# Patient Record
Sex: FEMALE | Race: WHITE | URBAN - METROPOLITAN AREA
[De-identification: names, ages, dates, MRNs, and addresses within clinical notes are randomized per-mention and may not be internally consistent; named-entity substitution may affect disease eponyms.]

---

## 2017-08-09 ENCOUNTER — HOSPITAL ENCOUNTER (OUTPATIENT)
Dept: CT IMAGING | Age: 42
Discharge: HOME | End: 2017-08-09
Payer: COMMERCIAL

## 2017-08-09 VITALS — BODY MASS INDEX: 31.65 KG/M2 | WEIGHT: 190 LBS | HEIGHT: 65 IN

## 2017-08-09 DIAGNOSIS — R93.0 MAXILLARY OPACIFICATION: ICD-10-CM

## 2017-08-09 DIAGNOSIS — J32.0 CHRONIC MAXILLARY SINUSITIS: ICD-10-CM

## 2017-08-09 PROCEDURE — 70486 CT MAXILLOFACIAL W/O DYE: CPT

## 2020-12-20 ENCOUNTER — HOSPITAL ENCOUNTER (EMERGENCY)
Age: 45
Discharge: HOME | End: 2020-12-21
Attending: EMERGENCY MEDICINE
Payer: COMMERCIAL

## 2020-12-20 PROCEDURE — 99285 EMERGENCY DEPT VISIT HI MDM: CPT

## 2020-12-20 RX ORDER — 0.9 % SODIUM CHLORIDE 0.9 %
500 INTRAVENOUS SOLUTION INTRAVENOUS ONCE
Status: COMPLETED | OUTPATIENT
Start: 2020-12-20 | End: 2020-12-21

## 2020-12-21 ENCOUNTER — APPOINTMENT (OUTPATIENT)
Dept: GENERAL RADIOLOGY | Age: 45
End: 2020-12-21
Payer: COMMERCIAL

## 2020-12-21 ENCOUNTER — APPOINTMENT (OUTPATIENT)
Dept: CT IMAGING | Age: 45
End: 2020-12-21
Payer: COMMERCIAL

## 2020-12-21 VITALS
SYSTOLIC BLOOD PRESSURE: 147 MMHG | RESPIRATION RATE: 21 BRPM | BODY MASS INDEX: 27.49 KG/M2 | TEMPERATURE: 98.7 F | HEIGHT: 65 IN | HEART RATE: 91 BPM | DIASTOLIC BLOOD PRESSURE: 94 MMHG | OXYGEN SATURATION: 97 % | WEIGHT: 165 LBS

## 2020-12-21 LAB
ANION GAP SERPL CALCULATED.3IONS-SCNC: 15 MEQ/L (ref 9–15)
BASOPHILS ABSOLUTE: 0.1 K/UL (ref 0–0.2)
BASOPHILS RELATIVE PERCENT: 0.9 %
BUN BLDV-MCNC: 10 MG/DL (ref 6–20)
CALCIUM SERPL-MCNC: 9.1 MG/DL (ref 8.5–9.9)
CHLORIDE BLD-SCNC: 95 MEQ/L (ref 95–107)
CHP ED QC CHECK: NORMAL
CO2: 27 MEQ/L (ref 20–31)
CREAT SERPL-MCNC: 0.77 MG/DL (ref 0.5–0.9)
EKG ATRIAL RATE: 90 BPM
EKG P AXIS: 54 DEGREES
EKG P-R INTERVAL: 192 MS
EKG Q-T INTERVAL: 386 MS
EKG QRS DURATION: 90 MS
EKG QTC CALCULATION (BAZETT): 472 MS
EKG R AXIS: 18 DEGREES
EKG T AXIS: 67 DEGREES
EKG VENTRICULAR RATE: 90 BPM
EOSINOPHILS ABSOLUTE: 0.1 K/UL (ref 0–0.7)
EOSINOPHILS RELATIVE PERCENT: 1.3 %
GFR AFRICAN AMERICAN: >60
GFR NON-AFRICAN AMERICAN: >60
GLUCOSE BLD-MCNC: 92 MG/DL (ref 70–99)
HCT VFR BLD CALC: 37.4 % (ref 37–47)
HEMOGLOBIN: 12.3 G/DL (ref 12–16)
LYMPHOCYTES ABSOLUTE: 2.4 K/UL (ref 1–4.8)
LYMPHOCYTES RELATIVE PERCENT: 23.5 %
MAGNESIUM: 2.1 MG/DL (ref 1.7–2.4)
MCH RBC QN AUTO: 27.3 PG (ref 27–31.3)
MCHC RBC AUTO-ENTMCNC: 32.9 % (ref 33–37)
MCV RBC AUTO: 83.1 FL (ref 82–100)
MONOCYTES ABSOLUTE: 0.6 K/UL (ref 0.2–0.8)
MONOCYTES RELATIVE PERCENT: 5.4 %
NEUTROPHILS ABSOLUTE: 7.1 K/UL (ref 1.4–6.5)
NEUTROPHILS RELATIVE PERCENT: 68.9 %
PDW BLD-RTO: 17.7 % (ref 11.5–14.5)
PLATELET # BLD: 280 K/UL (ref 130–400)
POTASSIUM SERPL-SCNC: 2.7 MEQ/L (ref 3.4–4.9)
PREGNANCY TEST URINE, POC: NEGATIVE
RBC # BLD: 4.5 M/UL (ref 4.2–5.4)
SODIUM BLD-SCNC: 137 MEQ/L (ref 135–144)
TROPONIN: <0.01 NG/ML (ref 0–0.01)
WBC # BLD: 10.3 K/UL (ref 4.8–10.8)

## 2020-12-21 PROCEDURE — 84484 ASSAY OF TROPONIN QUANT: CPT

## 2020-12-21 PROCEDURE — 71045 X-RAY EXAM CHEST 1 VIEW: CPT

## 2020-12-21 PROCEDURE — 70450 CT HEAD/BRAIN W/O DYE: CPT

## 2020-12-21 PROCEDURE — 2580000003 HC RX 258: Performed by: EMERGENCY MEDICINE

## 2020-12-21 PROCEDURE — 83735 ASSAY OF MAGNESIUM: CPT

## 2020-12-21 PROCEDURE — 80048 BASIC METABOLIC PNL TOTAL CA: CPT

## 2020-12-21 PROCEDURE — 36415 COLL VENOUS BLD VENIPUNCTURE: CPT

## 2020-12-21 PROCEDURE — 85025 COMPLETE CBC W/AUTO DIFF WBC: CPT

## 2020-12-21 PROCEDURE — 93005 ELECTROCARDIOGRAM TRACING: CPT | Performed by: EMERGENCY MEDICINE

## 2020-12-21 PROCEDURE — 6370000000 HC RX 637 (ALT 250 FOR IP): Performed by: EMERGENCY MEDICINE

## 2020-12-21 PROCEDURE — 93010 ELECTROCARDIOGRAM REPORT: CPT | Performed by: INTERNAL MEDICINE

## 2020-12-21 RX ORDER — POTASSIUM CHLORIDE 20 MEQ/1
40 TABLET, EXTENDED RELEASE ORAL ONCE
Status: COMPLETED | OUTPATIENT
Start: 2020-12-21 | End: 2020-12-21

## 2020-12-21 RX ADMIN — SODIUM CHLORIDE 500 ML: 9 INJECTION, SOLUTION INTRAVENOUS at 00:00

## 2020-12-21 RX ADMIN — POTASSIUM CHLORIDE 40 MEQ: 20 TABLET, EXTENDED RELEASE ORAL at 02:16

## 2020-12-21 NOTE — ED PROVIDER NOTES
[address] [facility] ED  EMERGENCY DEPARTMENT ENCOUNTER      Pt Name: Cherry Saunders  MRN: [id number]  [place] 1975  Date of evaluation: 12/20/2020  Provider: Elian Howard MD    CHIEF COMPLAINT       Chief Complaint   Patient presents with    Fall         HISTORY OF PRESENT ILLNESS   (Location/Symptom, Timing/Onset, Context/Setting, Quality, Duration, Modifying Factors, Severity)  Note limiting factors. 79-year-old female presenting after a syncopal event about 1-2 hours prior to arrival.  Patient states she walked out of her shower and passed out. She is unsure if she hit her head or not. She had no prodrome prior to passing out. Presently with no chest pain shortness of breath. Denies any headache. Has not taken anything for relief prior to arrival.  Denies any drug or alcohol use. Denies any symptoms at present. No cardiac history noted. Nursing Notes were reviewed. REVIEW OF SYSTEMS    (2-9 systems for level 4, 10 or more for level 5)     Review of Systems   Neurological: Positive for syncope. All other systems reviewed and are negative. Except as noted above the remainder of the review of systems was reviewed and negative. PAST MEDICAL HISTORY   History reviewed. No pertinent past medical history. SURGICAL HISTORY       Past Surgical History:   Procedure Laterality Date    CHOLECYSTECTOMY           CURRENT MEDICATIONS       Current Discharge Medication List      CONTINUE these medications which have NOT CHANGED    Details   interferon beta-1a (AVONEX) 30 MCG injection Inject 30 mcg into the muscle once a week             ALLERGIES     Patient has no known allergies. FAMILY HISTORY     History reviewed. No pertinent family history.        SOCIAL HISTORY       Social History     Socioeconomic History    Marital status: Single     Spouse name: None    Number of children: None    Years of education: None    Highest education level: None   Occupational History    None   Social Needs    Financial resource strain: None    Food insecurity     Worry: None     Inability: None    Transportation needs     Medical: None     Non-medical: None   Tobacco Use    Smoking status: Current Every Day Smoker     Packs/day: 1.00     Types: Cigarettes    Smokeless tobacco: Never Used   Substance and Sexual Activity    Alcohol use: Not Currently    Drug use: None    Sexual activity: None   Lifestyle    Physical activity     Days per week: None     Minutes per session: None    Stress: None   Relationships    Social connections     Talks on phone: None     Gets together: None     Attends Confucianist service: None     Active member of club or organization: None     Attends meetings of clubs or organizations: None     Relationship status: None    Intimate partner violence     Fear of current or ex partner: None     Emotionally abused: None     Physically abused: None     Forced sexual activity: None   Other Topics Concern    None   Social History Narrative    None       SCREENINGS    Rickey Coma Scale  Eye Opening: Spontaneous  Best Verbal Response: Oriented  Best Motor Response: Obeys commands  [place] Coma Scale Score: 15          PHYSICAL EXAM    (up to 7 for level 4, 8 or more for level 5)     ED Triage Vitals   BP Temp Temp Source Pulse Resp SpO2 Height Weight   12/20/20 2356 12/20/20 2356 12/20/20 2356 12/20/20 2356 12/20/20 2356 12/20/20 2356 12/20/20 2356 12/20/20 2356   (!) 145/92 98.7 °F (37.1 °C) Oral 94 20 99 % 5' 5\" (1.651 m) 165 lb (74.8 kg)       Physical Exam  Vitals signs and nursing note reviewed. Constitutional:       General: She is not in acute distress. Appearance: Normal appearance. She is well-developed. HENT:      Head: Normocephalic and atraumatic. Comments: No hematoma noted     Mouth/Throat:      Mouth: Mucous membranes are moist.      Pharynx: Oropharynx is clear. Eyes:      Extraocular Movements: Extraocular movements intact. Conjunctiva/sclera: Conjunctivae normal.   Neck:      Musculoskeletal: Normal range of motion and neck supple. Cardiovascular:      Rate and Rhythm: Normal rate and regular rhythm. Pulmonary:      Effort: Pulmonary effort is normal.      Breath sounds: Normal breath sounds. Abdominal:      General: Bowel sounds are normal.      Palpations: Abdomen is soft. Musculoskeletal: Normal range of motion. General: No deformity. Skin:     General: Skin is warm and dry. Capillary Refill: Capillary refill takes less than 2 seconds. Neurological:      General: No focal deficit present. Mental Status: She is alert and oriented to person, place, and time. Mental status is at baseline. Cranial Nerves: No cranial nerve deficit. Psychiatric:         Thought Content:  Thought content normal.         DIAGNOSTIC RESULTS     EKG: All EKG's are interpreted by the Emergency Department Physician who either signs or Co-signs this chart in the absence of a cardiologist.    NSR, rate 90, normal intervals, no ST elevation/ depression    RADIOLOGY:   Non-plain film images such as CT, Ultrasound and MRI are read by the radiologist. Plain radiographic images are visualized and preliminarily interpreted by the emergency physician with the below findings:    CXR- no acute findings    Interpretation per the Radiologist below, if available at the time of this note:    CT Head WO Contrast    (Results Pending)   XR CHEST PORTABLE    (Results Pending)       LABS:  Labs Reviewed   BASIC METABOLIC PANEL - Abnormal; Notable for the following components:       Result Value    Potassium 2.7 (*)     All other components within normal limits    Narrative:     Paras [facility] tel. [phone],  Potassium results called to and read back by Dahiana Garcia, 12/21/2020  01:04, by MAYNOR   CBC WITH AUTO DIFFERENTIAL - Abnormal; Notable for the following components:    MCHC 32.9 (*)     RDW 17.7 (*)     Neutrophils Absolute 7.1 (*) All other components within normal limits   POCT URINE PREGNANCY - Normal   TROPONIN   MAGNESIUM    Narrative:     CALL  Miranda  [facility] tel. [phone],  Potassium results called to and read back by Danette Chapman, 12/21/2020  01:04, by [facility]   MAGNESIUM       All other labs were within normal range or not returned as of this dictation. EMERGENCY DEPARTMENT COURSE and DIFFERENTIAL DIAGNOSIS/MDM:   Vitals:    Vitals:    12/20/20 2356 12/21/20 0000 12/21/20 0030 12/21/20 0100   BP: (!) 145/92 (!) 148/131 (!) 144/85 (!) 147/94   Pulse: 94 90 88 91   Resp: 20 18 20 21   Temp: 98.7 °F (37.1 °C)      TempSrc: Oral      SpO2: 95% 98% 96% 97%   Weight: 165 lb (74.8 kg)      Height: 5' 5\" (1.651 m)          MDM  Number of Diagnoses or Management Options  Syncope and collapse  Diagnosis management comments: 46-year-old female presenting after syncopal event. Work-up is negative. Alert and oriented with no complaints on discharge. Patient will be discharged home in good condition. Patient has been hemodynamically stable throughout ED course and is appropriate for outpatient follow up. Patient should follow up with PCP in 2-3 days or return to ED immediately for any new or worsening symptoms. Patient is well appearing on discharge and agreeable with plan of care. Patient Progress  Patient progress: stable      Procedures    CRITICAL CARE TIME   Total Critical Care time was 0 minutes, excluding separately reportable procedures. There was a high probability of clinically significant/life threatening deterioration in the patient's condition which required my urgent intervention. FINAL IMPRESSION      1.  Syncope and collapse          DISPOSITION/PLAN   DISPOSITION Decision To Discharge 12/21/2020 02:46:29 AM      (Please note that portions of this note were completed with a voice recognition program.  Efforts were made to edit the dictations but occasionally words are mis-transcribed.)    Constantin Rausch MD (electronically signed)  Attending Emergency Physician        Jovanny Girard MD  12/21/20 4740

## 2020-12-21 NOTE — ED TRIAGE NOTES
Patient to ED via squad for fall and states \"blacked out. \" after falling and hitting head on wall/drywall. Patient is alert and oriented, skin is pink, warm, dry. Respirations are even and non-labored. Denies pain/discomforts. Only complaint is occasional confusion. SUSY.

## 2020-12-21 NOTE — ED NOTES
Bed: 15  Expected date: 12/20/20  Expected time: 11:35 PM  Means of arrival:   Comments:  38 y/o F syncope fell in shower    Hx MS    149/86, 90p, 18r, 93% ra,     20 left ac     Naga Weir, JAYDON  12/20/20 4935

## 2022-12-05 ENCOUNTER — OFFICE VISIT (OUTPATIENT)
Dept: PRIMARY CARE CLINIC | Age: 47
End: 2022-12-05
Payer: COMMERCIAL

## 2022-12-05 VITALS
OXYGEN SATURATION: 99 % | HEART RATE: 81 BPM | HEIGHT: 65 IN | DIASTOLIC BLOOD PRESSURE: 78 MMHG | SYSTOLIC BLOOD PRESSURE: 126 MMHG | WEIGHT: 210 LBS | BODY MASS INDEX: 34.99 KG/M2 | RESPIRATION RATE: 18 BRPM | TEMPERATURE: 97.2 F

## 2022-12-05 DIAGNOSIS — Z12.11 SPECIAL SCREENING FOR MALIGNANT NEOPLASMS, COLON: ICD-10-CM

## 2022-12-05 DIAGNOSIS — F43.23 ADJUSTMENT DISORDER WITH MIXED ANXIETY AND DEPRESSED MOOD: ICD-10-CM

## 2022-12-05 DIAGNOSIS — K21.9 GASTROESOPHAGEAL REFLUX DISEASE WITHOUT ESOPHAGITIS: ICD-10-CM

## 2022-12-05 DIAGNOSIS — M51.36 LUMBAR DEGENERATIVE DISC DISEASE: Primary | ICD-10-CM

## 2022-12-05 DIAGNOSIS — Z00.00 HEALTH CARE MAINTENANCE: ICD-10-CM

## 2022-12-05 DIAGNOSIS — Z12.31 ENCOUNTER FOR MAMMOGRAM TO ESTABLISH BASELINE MAMMOGRAM: ICD-10-CM

## 2022-12-05 DIAGNOSIS — G35 HX OF MULTIPLE SCLEROSIS (HCC): ICD-10-CM

## 2022-12-05 DIAGNOSIS — G56.03 BILATERAL CARPAL TUNNEL SYNDROME: ICD-10-CM

## 2022-12-05 PROCEDURE — 4004F PT TOBACCO SCREEN RCVD TLK: CPT | Performed by: INTERNAL MEDICINE

## 2022-12-05 PROCEDURE — G8427 DOCREV CUR MEDS BY ELIG CLIN: HCPCS | Performed by: INTERNAL MEDICINE

## 2022-12-05 PROCEDURE — 99204 OFFICE O/P NEW MOD 45 MIN: CPT | Performed by: INTERNAL MEDICINE

## 2022-12-05 PROCEDURE — G8417 CALC BMI ABV UP PARAM F/U: HCPCS | Performed by: INTERNAL MEDICINE

## 2022-12-05 PROCEDURE — G8484 FLU IMMUNIZE NO ADMIN: HCPCS | Performed by: INTERNAL MEDICINE

## 2022-12-05 RX ORDER — EPINEPHRINE 0.3 MG/.3ML
0.3 INJECTION SUBCUTANEOUS ONCE
COMMUNITY
Start: 2020-12-24

## 2022-12-05 RX ORDER — ESCITALOPRAM OXALATE 5 MG/1
5 TABLET ORAL DAILY
Qty: 90 TABLET | Refills: 1 | Status: SHIPPED | OUTPATIENT
Start: 2022-12-05

## 2022-12-05 RX ORDER — PAROXETINE HYDROCHLORIDE 40 MG/1
40 TABLET, FILM COATED ORAL DAILY
COMMUNITY
Start: 2021-05-07

## 2022-12-05 RX ORDER — OMEPRAZOLE 20 MG/1
20 CAPSULE, DELAYED RELEASE ORAL DAILY
COMMUNITY
Start: 2020-03-02 | End: 2022-12-05 | Stop reason: SDUPTHER

## 2022-12-05 RX ORDER — UREA 40 %
CREAM (GRAM) TOPICAL
COMMUNITY
Start: 2022-11-08

## 2022-12-05 RX ORDER — GABAPENTIN 800 MG/1
800 TABLET ORAL 4 TIMES DAILY
COMMUNITY
Start: 2022-11-08 | End: 2023-03-08

## 2022-12-05 RX ORDER — FAMOTIDINE 20 MG/1
20 TABLET, FILM COATED ORAL 2 TIMES DAILY
COMMUNITY
Start: 2021-04-04 | End: 2022-12-05

## 2022-12-05 RX ORDER — OMEPRAZOLE 20 MG/1
20 CAPSULE, DELAYED RELEASE ORAL DAILY
Qty: 60 CAPSULE | Refills: 3 | Status: SHIPPED | OUTPATIENT
Start: 2022-12-05

## 2022-12-05 RX ORDER — NAPROXEN 500 MG/1
500 TABLET ORAL 2 TIMES DAILY PRN
COMMUNITY
Start: 2020-12-24

## 2022-12-05 RX ORDER — HYDROXYZINE 50 MG/1
50 TABLET, FILM COATED ORAL
COMMUNITY
Start: 2022-03-04

## 2022-12-05 SDOH — ECONOMIC STABILITY: FOOD INSECURITY: WITHIN THE PAST 12 MONTHS, YOU WORRIED THAT YOUR FOOD WOULD RUN OUT BEFORE YOU GOT MONEY TO BUY MORE.: NEVER TRUE

## 2022-12-05 SDOH — ECONOMIC STABILITY: TRANSPORTATION INSECURITY
IN THE PAST 12 MONTHS, HAS LACK OF TRANSPORTATION KEPT YOU FROM MEETINGS, WORK, OR FROM GETTING THINGS NEEDED FOR DAILY LIVING?: NO

## 2022-12-05 SDOH — ECONOMIC STABILITY: FOOD INSECURITY: WITHIN THE PAST 12 MONTHS, THE FOOD YOU BOUGHT JUST DIDN'T LAST AND YOU DIDN'T HAVE MONEY TO GET MORE.: NEVER TRUE

## 2022-12-05 SDOH — ECONOMIC STABILITY: TRANSPORTATION INSECURITY
IN THE PAST 12 MONTHS, HAS THE LACK OF TRANSPORTATION KEPT YOU FROM MEDICAL APPOINTMENTS OR FROM GETTING MEDICATIONS?: NO

## 2022-12-05 ASSESSMENT — PATIENT HEALTH QUESTIONNAIRE - PHQ9
2. FEELING DOWN, DEPRESSED OR HOPELESS: 2
1. LITTLE INTEREST OR PLEASURE IN DOING THINGS: 3
3. TROUBLE FALLING OR STAYING ASLEEP: 3
5. POOR APPETITE OR OVEREATING: 0
SUM OF ALL RESPONSES TO PHQ9 QUESTIONS 1 & 2: 5
6. FEELING BAD ABOUT YOURSELF - OR THAT YOU ARE A FAILURE OR HAVE LET YOURSELF OR YOUR FAMILY DOWN: 0
7. TROUBLE CONCENTRATING ON THINGS, SUCH AS READING THE NEWSPAPER OR WATCHING TELEVISION: 2
SUM OF ALL RESPONSES TO PHQ QUESTIONS 1-9: 15
10. IF YOU CHECKED OFF ANY PROBLEMS, HOW DIFFICULT HAVE THESE PROBLEMS MADE IT FOR YOU TO DO YOUR WORK, TAKE CARE OF THINGS AT HOME, OR GET ALONG WITH OTHER PEOPLE: 1
8. MOVING OR SPEAKING SO SLOWLY THAT OTHER PEOPLE COULD HAVE NOTICED. OR THE OPPOSITE, BEING SO FIGETY OR RESTLESS THAT YOU HAVE BEEN MOVING AROUND A LOT MORE THAN USUAL: 2
9. THOUGHTS THAT YOU WOULD BE BETTER OFF DEAD, OR OF HURTING YOURSELF: 0
4. FEELING TIRED OR HAVING LITTLE ENERGY: 3
SUM OF ALL RESPONSES TO PHQ QUESTIONS 1-9: 15

## 2022-12-05 ASSESSMENT — COLUMBIA-SUICIDE SEVERITY RATING SCALE - C-SSRS
6. HAVE YOU EVER DONE ANYTHING, STARTED TO DO ANYTHING, OR PREPARED TO DO ANYTHING TO END YOUR LIFE?: NO
2. HAVE YOU ACTUALLY HAD ANY THOUGHTS OF KILLING YOURSELF?: NO
1. WITHIN THE PAST MONTH, HAVE YOU WISHED YOU WERE DEAD OR WISHED YOU COULD GO TO SLEEP AND NOT WAKE UP?: NO

## 2022-12-05 ASSESSMENT — SOCIAL DETERMINANTS OF HEALTH (SDOH): HOW HARD IS IT FOR YOU TO PAY FOR THE VERY BASICS LIKE FOOD, HOUSING, MEDICAL CARE, AND HEATING?: NOT HARD AT ALL
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
## 2022-12-06 ASSESSMENT — ENCOUNTER SYMPTOMS
ABDOMINAL PAIN: 0
NAUSEA: 0
SORE THROAT: 0
RHINORRHEA: 0
WHEEZING: 0
VOMITING: 0
SHORTNESS OF BREATH: 0
SINUS PRESSURE: 0
COUGH: 0
SINUS PAIN: 0
DIARRHEA: 0

## 2022-12-09 DIAGNOSIS — Z00.00 HEALTH CARE MAINTENANCE: ICD-10-CM

## 2022-12-09 LAB
ALBUMIN SERPL-MCNC: 4.6 G/DL (ref 3.5–4.6)
ALP BLD-CCNC: 74 U/L (ref 40–130)
ALT SERPL-CCNC: 15 U/L (ref 0–33)
ANION GAP SERPL CALCULATED.3IONS-SCNC: 12 MEQ/L (ref 9–15)
AST SERPL-CCNC: 13 U/L (ref 0–35)
BASOPHILS ABSOLUTE: 0 K/UL (ref 0–0.2)
BASOPHILS RELATIVE PERCENT: 0.7 %
BILIRUB SERPL-MCNC: 0.3 MG/DL (ref 0.2–0.7)
BUN BLDV-MCNC: 9 MG/DL (ref 6–20)
CALCIUM SERPL-MCNC: 9.6 MG/DL (ref 8.5–9.9)
CHLORIDE BLD-SCNC: 98 MEQ/L (ref 95–107)
CHOLESTEROL, FASTING: 297 MG/DL (ref 0–199)
CO2: 26 MEQ/L (ref 20–31)
CREAT SERPL-MCNC: 0.79 MG/DL (ref 0.5–0.9)
EOSINOPHILS ABSOLUTE: 0.1 K/UL (ref 0–0.7)
EOSINOPHILS RELATIVE PERCENT: 1.7 %
GFR SERPL CREATININE-BSD FRML MDRD: >60 ML/MIN/{1.73_M2}
GLOBULIN: 2.6 G/DL (ref 2.3–3.5)
GLUCOSE BLD-MCNC: 99 MG/DL (ref 70–99)
HCT VFR BLD CALC: 41.3 % (ref 37–47)
HDLC SERPL-MCNC: 43 MG/DL (ref 40–59)
HEMOGLOBIN: 13.4 G/DL (ref 12–16)
LDL CHOLESTEROL CALCULATED: 221 MG/DL (ref 0–129)
LYMPHOCYTES ABSOLUTE: 2 K/UL (ref 1–4.8)
LYMPHOCYTES RELATIVE PERCENT: 31.8 %
MCH RBC QN AUTO: 29.4 PG (ref 27–31.3)
MCHC RBC AUTO-ENTMCNC: 32.3 % (ref 33–37)
MCV RBC AUTO: 91 FL (ref 79.4–94.8)
MONOCYTES ABSOLUTE: 0.5 K/UL (ref 0.2–0.8)
MONOCYTES RELATIVE PERCENT: 7.4 %
NEUTROPHILS ABSOLUTE: 3.7 K/UL (ref 1.4–6.5)
NEUTROPHILS RELATIVE PERCENT: 58.4 %
PDW BLD-RTO: 14 % (ref 11.5–14.5)
PLATELET # BLD: 282 K/UL (ref 130–400)
POTASSIUM SERPL-SCNC: 3.6 MEQ/L (ref 3.4–4.9)
RBC # BLD: 4.54 M/UL (ref 4.2–5.4)
SODIUM BLD-SCNC: 136 MEQ/L (ref 135–144)
TOTAL PROTEIN: 7.2 G/DL (ref 6.3–8)
TRIGLYCERIDE, FASTING: 167 MG/DL (ref 0–150)
TSH REFLEX: 1.96 UIU/ML (ref 0.44–3.86)
WBC # BLD: 6.3 K/UL (ref 4.8–10.8)

## 2022-12-10 LAB
HEPATITIS C ANTIBODY: NONREACTIVE
HIV AG/AB: NONREACTIVE

## 2022-12-12 DIAGNOSIS — E78.5 HYPERLIPIDEMIA, UNSPECIFIED HYPERLIPIDEMIA TYPE: Primary | ICD-10-CM

## 2022-12-12 RX ORDER — ROSUVASTATIN CALCIUM 40 MG/1
40 TABLET, COATED ORAL DAILY
Qty: 90 TABLET | Refills: 1 | Status: SHIPPED | OUTPATIENT
Start: 2022-12-12

## 2022-12-19 ENCOUNTER — OFFICE VISIT (OUTPATIENT)
Dept: PRIMARY CARE CLINIC | Age: 47
End: 2022-12-19
Payer: COMMERCIAL

## 2022-12-19 VITALS
RESPIRATION RATE: 18 BRPM | OXYGEN SATURATION: 98 % | TEMPERATURE: 97 F | WEIGHT: 207 LBS | SYSTOLIC BLOOD PRESSURE: 124 MMHG | BODY MASS INDEX: 34.49 KG/M2 | DIASTOLIC BLOOD PRESSURE: 64 MMHG | HEIGHT: 65 IN | HEART RATE: 84 BPM

## 2022-12-19 DIAGNOSIS — F43.23 ADJUSTMENT DISORDER WITH MIXED ANXIETY AND DEPRESSED MOOD: Primary | ICD-10-CM

## 2022-12-19 DIAGNOSIS — E78.5 HYPERLIPIDEMIA, UNSPECIFIED HYPERLIPIDEMIA TYPE: ICD-10-CM

## 2022-12-19 DIAGNOSIS — G47.00 INSOMNIA, UNSPECIFIED TYPE: ICD-10-CM

## 2022-12-19 DIAGNOSIS — M51.36 LUMBAR DEGENERATIVE DISC DISEASE: ICD-10-CM

## 2022-12-19 PROCEDURE — G8417 CALC BMI ABV UP PARAM F/U: HCPCS | Performed by: INTERNAL MEDICINE

## 2022-12-19 PROCEDURE — 99214 OFFICE O/P EST MOD 30 MIN: CPT | Performed by: INTERNAL MEDICINE

## 2022-12-19 PROCEDURE — 4004F PT TOBACCO SCREEN RCVD TLK: CPT | Performed by: INTERNAL MEDICINE

## 2022-12-19 PROCEDURE — 96372 THER/PROPH/DIAG INJ SC/IM: CPT | Performed by: INTERNAL MEDICINE

## 2022-12-19 PROCEDURE — G8427 DOCREV CUR MEDS BY ELIG CLIN: HCPCS | Performed by: INTERNAL MEDICINE

## 2022-12-19 PROCEDURE — G8484 FLU IMMUNIZE NO ADMIN: HCPCS | Performed by: INTERNAL MEDICINE

## 2022-12-19 RX ORDER — KETOROLAC TROMETHAMINE 10 MG/1
10 TABLET, FILM COATED ORAL EVERY 6 HOURS PRN
Qty: 20 TABLET | Refills: 0 | Status: SHIPPED | OUTPATIENT
Start: 2022-12-19 | End: 2023-12-19

## 2022-12-19 RX ORDER — ESCITALOPRAM OXALATE 10 MG/1
10 TABLET ORAL DAILY
Qty: 30 TABLET | Refills: 3 | Status: SHIPPED | OUTPATIENT
Start: 2022-12-19

## 2022-12-19 RX ORDER — KETOROLAC TROMETHAMINE 30 MG/ML
60 INJECTION, SOLUTION INTRAMUSCULAR; INTRAVENOUS ONCE
Status: COMPLETED | OUTPATIENT
Start: 2022-12-19 | End: 2022-12-19

## 2022-12-19 RX ADMIN — KETOROLAC TROMETHAMINE 60 MG: 30 INJECTION, SOLUTION INTRAMUSCULAR; INTRAVENOUS at 10:15

## 2022-12-19 ASSESSMENT — ENCOUNTER SYMPTOMS
VOMITING: 0
DIARRHEA: 0
NAUSEA: 0
SHORTNESS OF BREATH: 0
ABDOMINAL PAIN: 0
COUGH: 0
WHEEZING: 0

## 2022-12-19 NOTE — PROGRESS NOTES
Subjective:      Patient ID: Gigi Myers is a 41 y.o. female    Follow up   HPI  Pt presents for follow up regarding management of anxiety and depression. Started on lexapro 5mg. No improvement in depression or anxiety. Attests to poor sleep, energy and appetite, no suicidal ideations or hallucinations. Yet to make psychologist appt. Past Atarax use to no avail. Pain mx and EMG scheduled. Hx lumbar degeneration on neurontin. Hx of MS not on meds. Commenced Crestor for hyperlipidemia. Carpal tunnel- awaits EMG and ortho appt. History reviewed. No pertinent past medical history. Past Surgical History:   Procedure Laterality Date    CHOLECYSTECTOMY       Social History     Socioeconomic History    Marital status: Single     Spouse name: Not on file    Number of children: Not on file    Years of education: Not on file    Highest education level: Not on file   Occupational History    Not on file   Tobacco Use    Smoking status: Every Day     Packs/day: 1.00     Types: Cigarettes    Smokeless tobacco: Never   Vaping Use    Vaping Use: Never used   Substance and Sexual Activity    Alcohol use: Not Currently    Drug use: Not on file    Sexual activity: Not on file   Other Topics Concern    Not on file   Social History Narrative    Not on file     Social Determinants of Health     Financial Resource Strain: Low Risk     Difficulty of Paying Living Expenses: Not hard at all   Food Insecurity: No Food Insecurity    Worried About Running Out of Food in the Last Year: Never true    [address] in the Last Year: Never true   Transportation Needs: No Transportation Needs    Lack of Transportation (Medical): No    Lack of Transportation (Non-Medical): No   Physical Activity: Not on file   Stress: Not on file   Social Connections: Not on file   Intimate Partner Violence: Not on file   Housing Stability: Not on file     History reviewed. No pertinent family history.   Allergies:  Seasonal  There is no problem list on file for this patient. Current Outpatient Medications on File Prior to Visit   Medication Sig Dispense Refill    rosuvastatin (CRESTOR) 40 MG tablet Take 1 tablet by mouth daily 90 tablet 1    EPINEPHrine (EPIPEN) 0.3 MG/0.3ML SOAJ injection Inject 0.3 mg into the muscle once      gabapentin (NEURONTIN) 800 MG tablet Take 800 mg by mouth 4 times daily. Urea (CARMOL) 40 % cream       omeprazole (PRILOSEC) 20 MG delayed release capsule Take 1 capsule by mouth daily 60 capsule 3     No current facility-administered medications on file prior to visit. Review of Systems   Respiratory:  Negative for cough, shortness of breath and wheezing. Cardiovascular:  Negative for chest pain. Gastrointestinal:  Negative for abdominal pain, diarrhea, nausea and vomiting. Endocrine: Negative for cold intolerance and heat intolerance. Genitourinary:  Negative for dysuria and frequency. Neurological:  Positive for numbness. Negative for dizziness and light-headedness. Psychiatric/Behavioral:  Positive for dysphoric mood and sleep disturbance. Negative for hallucinations and suicidal ideas. The patient is nervous/anxious. Objective:   /64   Pulse 84   Temp 97 °F (36.1 °C)   Resp 18   Ht 5' 5\" (1.651 m)   Wt 207 lb (93.9 kg)   SpO2 98%   BMI 34.45 kg/m²     Physical Exam  Constitutional:       General: She is not in acute distress. Appearance: She is not diaphoretic. Cardiovascular:      Rate and Rhythm: Normal rate and regular rhythm. Pulses: Normal pulses. Heart sounds: Normal heart sounds, S1 normal and S2 normal.   Pulmonary:      Effort: Pulmonary effort is normal. No respiratory distress. Breath sounds: Normal breath sounds. No wheezing or rales. Chest:      Chest wall: No tenderness. Abdominal:      General: Bowel sounds are normal.      Tenderness: There is no abdominal tenderness. Neurological:      General: No focal deficit present.       Mental Status: She is alert. Cranial Nerves: No cranial nerve deficit. Psychiatric:         Mood and Affect: Mood normal.         Behavior: Behavior normal.         Thought Content: Thought content normal.     Assessment:       Diagnosis Orders   1. Adjustment disorder with mixed anxiety and depressed mood Inadequately Controlled escitalopram (LEXAPRO) 10 MG tablet    will increase Lexapro      2. Lumbar degenerative disc disease Stable but not controlled ketorolac (TORADOL) injection 60 mg    ketorolac (TORADOL) 10 MG tablet    continue Lyrica       3. Hyperlipidemia, unspecified hyperlipidemia type        4. Insomnia, unspecified type Inadequately Controlled doxyLAMINE succinate (GNP SLEEP AID) 25 MG tablet    will commnece doxylamine        Plan:      No orders of the defined types were placed in this encounter. Orders Placed This Encounter   Medications    escitalopram (LEXAPRO) 10 MG tablet     Sig: Take 1 tablet by mouth daily     Dispense:  30 tablet     Refill:  3     DC Lexapro 5mg    doxyLAMINE succinate (GNP SLEEP AID) 25 MG tablet     Sig: Take 1 tablet by mouth nightly as needed for Sleep     Dispense:  30 tablet     Refill:  2    ketorolac (TORADOL) injection 60 mg    ketorolac (TORADOL) 10 MG tablet     Sig: Take 1 tablet by mouth every 6 hours as needed for Pain     Dispense:  20 tablet     Refill:  0     Return in about 2 weeks (around 1/2/2023) for follow up, with PCP.

## 2022-12-27 ENCOUNTER — OFFICE VISIT (OUTPATIENT)
Dept: ORTHOPEDIC SURGERY | Age: 47
End: 2022-12-27
Payer: COMMERCIAL

## 2022-12-27 VITALS
WEIGHT: 207 LBS | HEIGHT: 65 IN | OXYGEN SATURATION: 99 % | TEMPERATURE: 98.4 F | BODY MASS INDEX: 34.49 KG/M2 | HEART RATE: 78 BPM

## 2022-12-27 DIAGNOSIS — G56.21 CUBITAL TUNNEL SYNDROME ON RIGHT: Primary | ICD-10-CM

## 2022-12-27 PROCEDURE — G8484 FLU IMMUNIZE NO ADMIN: HCPCS | Performed by: STUDENT IN AN ORGANIZED HEALTH CARE EDUCATION/TRAINING PROGRAM

## 2022-12-27 PROCEDURE — 4004F PT TOBACCO SCREEN RCVD TLK: CPT | Performed by: STUDENT IN AN ORGANIZED HEALTH CARE EDUCATION/TRAINING PROGRAM

## 2022-12-27 PROCEDURE — G8417 CALC BMI ABV UP PARAM F/U: HCPCS | Performed by: STUDENT IN AN ORGANIZED HEALTH CARE EDUCATION/TRAINING PROGRAM

## 2022-12-27 PROCEDURE — G8427 DOCREV CUR MEDS BY ELIG CLIN: HCPCS | Performed by: STUDENT IN AN ORGANIZED HEALTH CARE EDUCATION/TRAINING PROGRAM

## 2022-12-27 PROCEDURE — 99203 OFFICE O/P NEW LOW 30 MIN: CPT | Performed by: STUDENT IN AN ORGANIZED HEALTH CARE EDUCATION/TRAINING PROGRAM

## 2022-12-27 ASSESSMENT — ENCOUNTER SYMPTOMS
EYES NEGATIVE: 1
ALLERGIC/IMMUNOLOGIC NEGATIVE: 1
RESPIRATORY NEGATIVE: 1
GASTROINTESTINAL NEGATIVE: 1

## 2022-12-27 NOTE — PROGRESS NOTES
Orthopedic Surgery and Sports Medicine    Subjective:      Patient ID: Eryn Bradshaw is a 41 y.o. female who presents today for:  Chief Complaint   Patient presents with    New Patient     Patient presents for bi-lateral  carpal tunnel pain. Right is worse. She has not had an EMG       HPI  Lisa Osman is a 79-year-old female who presents today for evaluation of her right arm. She reports pain, numbness, tingling, weakness in the right elbow, forearm, hand. She states that she has a history of bilateral carpal tunnel syndrome but this is somewhat different. She states that for the last 3 to 5 months she has had numbness in the small finger and ring finger. She also reports pain in the elbow and forearm. She states that her hand feels weak and she has been dropping things. She also reports cramping in the right hand. She has an EMG scheduled for next week. She denies any neck pain. Currently she does not have any symptoms on the left upper extremity. She has bilateral wrist splints for carpal tunnel that she has worn in the past.    Previous treatment: wrist braces  NSAIDs: Yes  Physical therapy: No    Hand Dominance: left  Workers Compensation:   Have you missed work for this issue? No  Is this issue being addressed under a worker's compensation claim? No    History reviewed. No pertinent past medical history.    Past Surgical History:   Procedure Laterality Date    CHOLECYSTECTOMY       Social History     Socioeconomic History    Marital status: Single     Spouse name: Not on file    Number of children: Not on file    Years of education: Not on file    Highest education level: Not on file   Occupational History    Not on file   Tobacco Use    Smoking status: Every Day     Packs/day: 1.00     Types: Cigarettes    Smokeless tobacco: Never   Vaping Use    Vaping Use: Never used   Substance and Sexual Activity    Alcohol use: Not Currently    Drug use: Not on file    Sexual activity: Not on file   Other Topics Concern    Not on file   Social History Narrative    Not on file     Social Determinants of Health     Financial Resource Strain: Low Risk     Difficulty of Paying Living Expenses: Not hard at all   Food Insecurity: No Food Insecurity    Worried About [address] in the Last Year: Never true    [facility] in the Last Year: Never true   Transportation Needs: No Transportation Needs    Lack of Transportation (Medical): No    Lack of Transportation (Non-Medical): No   Physical Activity: Not on file   Stress: Not on file   Social Connections: Not on file   Intimate Partner Violence: Not on file   Housing Stability: Not on file     History reviewed. No pertinent family history. Allergies   Allergen Reactions    Seasonal      Other reaction(s): Other: See Comments  Watering eyes, congestion     Current Outpatient Medications on File Prior to Visit   Medication Sig Dispense Refill    escitalopram (LEXAPRO) 10 MG tablet Take 1 tablet by mouth daily 30 tablet 3    doxyLAMINE succinate (GNP SLEEP AID) 25 MG tablet Take 1 tablet by mouth nightly as needed for Sleep 30 tablet 2    ketorolac (TORADOL) 10 MG tablet Take 1 tablet by mouth every 6 hours as needed for Pain 20 tablet 0    rosuvastatin (CRESTOR) 40 MG tablet Take 1 tablet by mouth daily 90 tablet 1    EPINEPHrine (EPIPEN) 0.3 MG/0.3ML SOAJ injection Inject 0.3 mg into the muscle once      gabapentin (NEURONTIN) 800 MG tablet Take 800 mg by mouth 4 times daily. Urea (CARMOL) 40 % cream       omeprazole (PRILOSEC) 20 MG delayed release capsule Take 1 capsule by mouth daily 60 capsule 3     No current facility-administered medications on file prior to visit. Review of Systems   Constitutional: Negative. HENT: Negative. Eyes: Negative. Respiratory: Negative. Cardiovascular: Negative. Gastrointestinal: Negative. Endocrine: Negative. Genitourinary: Negative. Musculoskeletal:  Positive for arthralgias and myalgias. Skin: Negative. Allergic/Immunologic: Negative. Neurological:  Positive for weakness and numbness. Hematological: Negative. Psychiatric/Behavioral: Negative. Objective:   Pulse 78   Temp 98.4 °F (36.9 °C) (Temporal)   Ht 5' 5\" (1.651 m)   Wt 207 lb (93.9 kg)   SpO2 99%   BMI 34.45 kg/m²     Physical Exam:  Ortho Exam    Right upper extremity: Skin intact. Brisk capillary refill to all digits. Able to demonstrate finger flexion/extension/abduction/adduction of fingers and flexion/extension at IP joint of thumb. Wrist with 50 degrees extension, 50 degrees flexion. Reports sensation intact to light touch across all fingers/thumb/hand. Able to make a full composite fist and extend to neutral. No instability across the fingers hand or wrist. No catching or clicking noted. No tenderness at the anatomic snuffbox, TFCC, radiocarpal joint, thumb CMC. 5/5 APB strength. Negative tinel's at the carpal tunnel. Positive tinels at the cubital tunnel. Negative compression test, phalen's maneuver. Decreased sensation in the small finger and ring finger as well as along the ulnar aspect of the forearm. Left upper extremity: Skin intact. Brisk capillary refill to all digits. Able to demonstrate finger flexion/extension/abduction/adduction of fingers and flexion/extension at IP joint of thumb. Wrist with 50 degrees extension, 50 degrees flexion. Reports sensation intact to light touch across all fingers/thumb/hand. Able to make a full composite fist and extend to neutral. No instability across the fingers hand or wrist. No catching or clicking noted. No tenderness at the anatomic snuffbox, TFCC, radiocarpal joint, thumb CMC. 5/5 APB strength. Negative tinel's at the carpal tunnel. Negative tinels at the cubital tunnel. Negative compression test, phalen's maneuver. Sensation intact to light touch in all fingers.         Radiographs and Laboratory Studies:     Diagnostic Imaging Studies: None    Laboratory Studies:   Lab Results   Component Value Date    WBC 6.3 12/09/2022    HGB 13.4 12/09/2022    HCT 41.3 12/09/2022    MCV 91.0 12/09/2022     12/09/2022     No results found for: SEDRATE  No results found for: CRP    Assessment:      Diagnosis Orders   1. Cubital tunnel syndrome on right          Mary Connor is a 41 y.o. female with a history and exam consistent with right cubital tunnel syndrome. This is an acute exacerbation of a chronic condition . Plan:     I had a discussion with the patient regarding her exam and diagnosis. She does have a history of bilateral carpal tunnel syndrome she states proven by EMG. However her symptoms today are more consistent with right cubital tunnel syndrome. She has an EMG scheduled for next week. Recommend that she have this EMG performed. She should follow-up with me in the clinic after the EMG for review and to discuss further treatment options. Return in about 2 weeks (around 1/10/2023) for EMG review.       Giovani Asencio MD

## 2022-12-30 DIAGNOSIS — M51.36 LUMBAR DEGENERATIVE DISC DISEASE: ICD-10-CM

## 2022-12-30 RX ORDER — KETOROLAC TROMETHAMINE 10 MG/1
10 TABLET, FILM COATED ORAL EVERY 6 HOURS PRN
Qty: 20 TABLET | Refills: 0 | OUTPATIENT
Start: 2022-12-30 | End: 2023-12-30

## 2023-01-03 ENCOUNTER — HOSPITAL ENCOUNTER (OUTPATIENT)
Dept: NEUROLOGY | Age: 48
Discharge: HOME | End: 2023-01-03
Payer: COMMERCIAL

## 2023-01-03 DIAGNOSIS — G56.03 BILATERAL CARPAL TUNNEL SYNDROME: ICD-10-CM

## 2023-01-03 PROCEDURE — 95911 NRV CNDJ TEST 9-10 STUDIES: CPT

## 2023-01-03 PROCEDURE — 95886 MUSC TEST DONE W/N TEST COMP: CPT

## 2023-01-03 NOTE — PROCEDURES
[address]                      [address], [address] [facility]                             ELECTROMYOGRAM REPORT    PATIENT NAME: Christopher Thompson                 :        1975  MED REC NO:   [id number]                            ROOM:  ACCOUNT NO:   [de-identified]                           ADMIT DATE: 2023  PROVIDER:     Janel Kay MD    DATE OF EM2023    REASON FOR STUDY:  Neurophysiological studies are requested for the  patient's underlying numbness in the fourth and fifth fingers on the  right. FINDINGS:  MOTOR NERVE CONDUCTION STUDIES:  Motor nerve conduction study of the  right median nerve shows normal amplitudes, latency, and conduction  velocity. Motor nerve conduction study of the left median nerve shows  borderline peak latency, normal amplitudes, and borderline conduction  velocity. Motor nerve conduction study of the right ulnar nerve shows  normal amplitudes, latency, and conduction velocity. There are no  conduction blocks. Left ulnar nerve motor nerve conduction study shows  normal amplitudes, latency, and conduction velocity. F-wave responses  are normal.    SENSORY NERVE CONDUCTION STUDIES:  Sensory nerve conduction study of the  right median nerve recorded in digit 2 shows minimally prolonged  latencies, normal amplitudes, and decreased conduction velocity. Further mid palm stimulation was obtained to confirm findings and this  shows on the right borderline peak latency, normal amplitudes, and  mildly decreased conduction velocity. The left median digit 2  examination shows prolonged latency, low amplitudes, and decreased  conduction velocity. The right ulnar digit 2 examination shows normal  amplitudes, latency, and conduction velocity. The left ulnar digit 2  examination shows normal amplitudes, latency, and conduction velocity.    The right ulnar mixed orthodromic study shows borderline peak latencies,  normal amplitudes, and borderline conduction velocity. The left ulnar  mixed orthodromic study shows normal amplitudes, latency, and conduction  velocity. The radial sensory nerve conduction studies are normal.    Needle electrode examination of the above-sampled muscles shows  decreased activated units seen in the abductor pollicis brevis muscles. There are some fallout units in the first dorsal interosseous muscle on  the right. IMPRESSION:  1. 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 This appears to be asymptomatic. 2.  The patient's symptoms appears to be in the fourth and fifth fingers  on the right. The nerve conduction studies are normal.  There are no  conduction blocks. There are no motor findings. The patient has  clinically an ulnar neuropathy at the elbow, but no damage has occurred. Of note, the patient has a borderline right median nerve neuropathy at  the wrist suggesting carpal tunnel syndrome, which is also asymptomatic. No active or chronic radiculopathies are notable. An alternative  diagnosis of thoracic outlet syndrome is a consideration. Multiple sensory nerve conduction studies are evaluated to avoid any  artifact of temperature differences. This test is personally performed and interpreted by me.         Huey Willett MD    D: 01/03/2023 9:17:28       T: 01/03/2023 9:19:51     [email or web address]  Job#: [id number]     Doc#: [id number]    CC:

## 2023-01-05 ENCOUNTER — OFFICE VISIT (OUTPATIENT)
Dept: PRIMARY CARE CLINIC | Age: 48
End: 2023-01-05
Payer: COMMERCIAL

## 2023-01-05 VITALS
HEART RATE: 67 BPM | SYSTOLIC BLOOD PRESSURE: 126 MMHG | HEIGHT: 65 IN | WEIGHT: 204.2 LBS | TEMPERATURE: 97.2 F | BODY MASS INDEX: 34.02 KG/M2 | DIASTOLIC BLOOD PRESSURE: 70 MMHG | RESPIRATION RATE: 18 BRPM | OXYGEN SATURATION: 98 %

## 2023-01-05 DIAGNOSIS — M51.36 LUMBAR DEGENERATIVE DISC DISEASE: ICD-10-CM

## 2023-01-05 DIAGNOSIS — G56.03 BILATERAL CARPAL TUNNEL SYNDROME: ICD-10-CM

## 2023-01-05 DIAGNOSIS — G47.00 INSOMNIA, UNSPECIFIED TYPE: ICD-10-CM

## 2023-01-05 DIAGNOSIS — F43.23 ADJUSTMENT DISORDER WITH MIXED ANXIETY AND DEPRESSED MOOD: Primary | ICD-10-CM

## 2023-01-05 PROCEDURE — 4004F PT TOBACCO SCREEN RCVD TLK: CPT | Performed by: INTERNAL MEDICINE

## 2023-01-05 PROCEDURE — 99214 OFFICE O/P EST MOD 30 MIN: CPT | Performed by: INTERNAL MEDICINE

## 2023-01-05 PROCEDURE — G8484 FLU IMMUNIZE NO ADMIN: HCPCS | Performed by: INTERNAL MEDICINE

## 2023-01-05 PROCEDURE — G8427 DOCREV CUR MEDS BY ELIG CLIN: HCPCS | Performed by: INTERNAL MEDICINE

## 2023-01-05 PROCEDURE — G8417 CALC BMI ABV UP PARAM F/U: HCPCS | Performed by: INTERNAL MEDICINE

## 2023-01-05 RX ORDER — GABAPENTIN 800 MG/1
800 TABLET ORAL 4 TIMES DAILY
Qty: 240 TABLET | Refills: 3 | Status: SHIPPED | OUTPATIENT
Start: 2023-01-05 | End: 2023-03-06

## 2023-01-05 RX ORDER — TRAZODONE HYDROCHLORIDE 50 MG/1
50 TABLET ORAL NIGHTLY
Qty: 30 TABLET | Refills: 3 | Status: SHIPPED | OUTPATIENT
Start: 2023-01-05

## 2023-01-05 RX ORDER — DEXAMETHASONE 2 MG/1
TABLET ORAL
COMMUNITY
Start: 2023-01-04

## 2023-01-05 ASSESSMENT — COLUMBIA-SUICIDE SEVERITY RATING SCALE - C-SSRS
1. WITHIN THE PAST MONTH, HAVE YOU WISHED YOU WERE DEAD OR WISHED YOU COULD GO TO SLEEP AND NOT WAKE UP?: NO
6. HAVE YOU EVER DONE ANYTHING, STARTED TO DO ANYTHING, OR PREPARED TO DO ANYTHING TO END YOUR LIFE?: NO
2. HAVE YOU ACTUALLY HAD ANY THOUGHTS OF KILLING YOURSELF?: NO

## 2023-01-05 ASSESSMENT — PATIENT HEALTH QUESTIONNAIRE - PHQ9
SUM OF ALL RESPONSES TO PHQ QUESTIONS 1-9: 11
SUM OF ALL RESPONSES TO PHQ QUESTIONS 1-9: 11
3. TROUBLE FALLING OR STAYING ASLEEP: 3
7. TROUBLE CONCENTRATING ON THINGS, SUCH AS READING THE NEWSPAPER OR WATCHING TELEVISION: 2
10. IF YOU CHECKED OFF ANY PROBLEMS, HOW DIFFICULT HAVE THESE PROBLEMS MADE IT FOR YOU TO DO YOUR WORK, TAKE CARE OF THINGS AT HOME, OR GET ALONG WITH OTHER PEOPLE: 0
SUM OF ALL RESPONSES TO PHQ9 QUESTIONS 1 & 2: 4
SUM OF ALL RESPONSES TO PHQ QUESTIONS 1-9: 11
SUM OF ALL RESPONSES TO PHQ QUESTIONS 1-9: 11
8. MOVING OR SPEAKING SO SLOWLY THAT OTHER PEOPLE COULD HAVE NOTICED. OR THE OPPOSITE, BEING SO FIGETY OR RESTLESS THAT YOU HAVE BEEN MOVING AROUND A LOT MORE THAN USUAL: 0
2. FEELING DOWN, DEPRESSED OR HOPELESS: 2
1. LITTLE INTEREST OR PLEASURE IN DOING THINGS: 2
6. FEELING BAD ABOUT YOURSELF - OR THAT YOU ARE A FAILURE OR HAVE LET YOURSELF OR YOUR FAMILY DOWN: 0
9. THOUGHTS THAT YOU WOULD BE BETTER OFF DEAD, OR OF HURTING YOURSELF: 0
5. POOR APPETITE OR OVEREATING: 0
4. FEELING TIRED OR HAVING LITTLE ENERGY: 2

## 2023-01-05 ASSESSMENT — ENCOUNTER SYMPTOMS
DIARRHEA: 0
SORE THROAT: 0
WHEEZING: 0
SHORTNESS OF BREATH: 0
COUGH: 0
RHINORRHEA: 0
SINUS PAIN: 0
BACK PAIN: 1
NAUSEA: 0
VOMITING: 0
ABDOMINAL PAIN: 0
SINUS PRESSURE: 0

## 2023-01-05 ASSESSMENT — ANXIETY QUESTIONNAIRES
GAD7 TOTAL SCORE: 17
3. WORRYING TOO MUCH ABOUT DIFFERENT THINGS: MORE THAN HALF THE DAYS
IF YOU CHECKED OFF ANY PROBLEMS ON THIS QUESTIONNAIRE, HOW DIFFICULT HAVE THESE PROBLEMS MADE IT FOR YOU TO DO YOUR WORK, TAKE CARE OF THINGS AT HOME, OR GET ALONG WITH OTHER PEOPLE: SOMEWHAT DIFFICULT
4. TROUBLE RELAXING: 3
2. NOT BEING ABLE TO STOP OR CONTROL WORRYING: 1
7. FEELING AFRAID AS IF SOMETHING AWFUL MIGHT HAPPEN: 2
1. FEELING NERVOUS, ANXIOUS, OR ON EDGE: NEARLY EVERY DAY
3. WORRYING TOO MUCH ABOUT DIFFERENT THINGS: 2
7. FEELING AFRAID AS IF SOMETHING AWFUL MIGHT HAPPEN: MORE THAN HALF THE DAYS
2. NOT BEING ABLE TO STOP OR CONTROL WORRYING: SEVERAL DAYS
1. FEELING NERVOUS, ANXIOUS, OR ON EDGE: 3
4. TROUBLE RELAXING: NEARLY EVERY DAY
5. BEING SO RESTLESS THAT IT IS HARD TO SIT STILL: 3
6. BECOMING EASILY ANNOYED OR IRRITABLE: 3
5. BEING SO RESTLESS THAT IT IS HARD TO SIT STILL: NEARLY EVERY DAY
IF YOU CHECKED OFF ANY PROBLEMS ON THIS QUESTIONNAIRE, HOW DIFFICULT HAVE THESE PROBLEMS MADE IT FOR YOU TO DO YOUR WORK, TAKE CARE OF THINGS AT HOME, OR GET ALONG WITH OTHER PEOPLE: SOMEWHAT DIFFICULT
6. BECOMING EASILY ANNOYED OR IRRITABLE: NEARLY EVERY DAY

## 2023-01-05 ASSESSMENT — LIFESTYLE VARIABLES
HAVE YOU EVER RECEIVED ALCOHOL OR OTHER DRUG ABUSE TREATMENT: NO
ALCOHOL_DAYS_PER_WEEK: 1
HISTORY_ALCOHOL_USE: NO
PAST THREE MONTHS WHAT IS THE LARGEST AMOUNT OF ALCOHOLIC DRINKS YOU HAVE CONSUMED IN ONE DAY: 1

## 2023-01-05 NOTE — PROGRESS NOTES
Subjective:      Patient ID: Ronak Mcdonald is a 41 y.o. female    Anxiety, lumbar degeneration f/u   HPI  Pt presents for follow up regarding management of anxiety and depression. Pt on Lexapro 10mg. No improvement in depression or anxiety due to uncontrolled pain. Attests to poor sleep, energy and appetite. No suicidal ideations or hallucinations. Psychologist appt is tomorrow. Seen by neurology for MS and lumbar degenerative disease. Decadron and ?? Tecfidera recommended. EMG showed right carpal tunnel and ulnar neuropathy. Will f/u with ortho. History reviewed. No pertinent past medical history. Past Surgical History:   Procedure Laterality Date    CHOLECYSTECTOMY       Social History     Socioeconomic History    Marital status: Single     Spouse name: Not on file    Number of children: Not on file    Years of education: Not on file    Highest education level: Not on file   Occupational History    Not on file   Tobacco Use    Smoking status: Every Day     Packs/day: 1.00     Types: Cigarettes    Smokeless tobacco: Never   Vaping Use    Vaping Use: Never used   Substance and Sexual Activity    Alcohol use: Not Currently    Drug use: Not on file    Sexual activity: Not on file   Other Topics Concern    Not on file   Social History Narrative    Not on file     Social Determinants of Health     Financial Resource Strain: Low Risk     Difficulty of Paying Living Expenses: Not hard at all   Food Insecurity: No Food Insecurity    Worried About Running Out of Food in the Last Year: Never true    [address] in the Last Year: Never true   Transportation Needs: No Transportation Needs    Lack of Transportation (Medical): No    Lack of Transportation (Non-Medical): No   Physical Activity: Not on file   Stress: Not on file   Social Connections: Not on file   Intimate Partner Violence: Not on file   Housing Stability: Not on file     History reviewed. No pertinent family history.   Allergies: Seasonal  There is no problem list on file for this patient. Current Outpatient Medications on File Prior to Visit   Medication Sig Dispense Refill    dexamethasone (DECADRON) 2 MG tablet       escitalopram (LEXAPRO) 10 MG tablet Take 1 tablet by mouth daily 30 tablet 3    rosuvastatin (CRESTOR) 40 MG tablet Take 1 tablet by mouth daily 90 tablet 1    EPINEPHrine (EPIPEN) 0.3 MG/0.3ML SOAJ injection Inject 0.3 mg into the muscle once      Urea (CARMOL) 40 % cream       omeprazole (PRILOSEC) 20 MG delayed release capsule Take 1 capsule by mouth daily 60 capsule 3     No current facility-administered medications on file prior to visit. Review of Systems   Constitutional:  Negative for chills, diaphoresis, fatigue and fever. HENT:  Negative for congestion, ear discharge, ear pain, rhinorrhea, sinus pressure, sinus pain, sneezing and sore throat. Respiratory:  Negative for cough, shortness of breath and wheezing. Cardiovascular:  Negative for chest pain. Gastrointestinal:  Negative for abdominal pain, diarrhea, nausea and vomiting. Endocrine: Negative for cold intolerance and heat intolerance. Genitourinary:  Negative for dysuria and frequency. Musculoskeletal:  Positive for back pain. Neurological:  Negative for dizziness and light-headedness. Psychiatric/Behavioral:  Positive for dysphoric mood. The patient is nervous/anxious. Objective:   /70   Pulse 67   Temp 97.2 °F (36.2 °C)   Resp 18   Ht 5' 5\" (1.651 m)   Wt 204 lb 3.2 oz (92.6 kg)   SpO2 98%   BMI 33.98 kg/m²     Physical Exam  Constitutional:       General: She is not in acute distress. Appearance: She is not diaphoretic. Cardiovascular:      Rate and Rhythm: Normal rate and regular rhythm. Pulses: Normal pulses. Heart sounds: Normal heart sounds, S1 normal and S2 normal.   Pulmonary:      Effort: Pulmonary effort is normal. No respiratory distress. Breath sounds: Normal breath sounds.  No wheezing or rales. Chest:      Chest wall: No tenderness. Abdominal:      General: Bowel sounds are normal.      Tenderness: There is no abdominal tenderness. Neurological:      Mental Status: She is alert. Assessment:       Diagnosis Orders   1. Adjustment disorder with mixed anxiety and depressed mood Inadequately Controlled     due to pain. Will reevaluate after longer use of Lexapro adn pain control      2. Lumbar degenerative disc disease  gabapentin (NEURONTIN) 800 MG tablet    planned for Decadron today       3. Insomnia, unspecified type Inadequately Controlled traZODone (DESYREL) 50 MG tablet      4. Bilateral carpal tunnel syndrome          Plan:      No orders of the defined types were placed in this encounter. Orders Placed This Encounter   Medications    gabapentin (NEURONTIN) 800 MG tablet     Sig: Take 1 tablet by mouth 4 times daily for 60 days. Dispense:  240 tablet     Refill:  3    traZODone (DESYREL) 50 MG tablet     Sig: Take 1 tablet by mouth nightly     Dispense:  30 tablet     Refill:  3     Dc doxylamine     Return in about 3 weeks (around 1/26/2023) for with PCP.

## 2023-01-06 ENCOUNTER — OFFICE VISIT (OUTPATIENT)
Dept: BEHAVIORAL/MENTAL HEALTH CLINIC | Age: 48
End: 2023-01-06

## 2023-01-06 DIAGNOSIS — F17.200 NICOTINE USE DISORDER: ICD-10-CM

## 2023-01-06 DIAGNOSIS — F41.9 ANXIETY DISORDER, UNSPECIFIED TYPE: ICD-10-CM

## 2023-01-06 DIAGNOSIS — F43.10 PTSD (POST-TRAUMATIC STRESS DISORDER): Primary | ICD-10-CM

## 2023-01-06 DIAGNOSIS — F32.A DEPRESSION, UNSPECIFIED DEPRESSION TYPE: ICD-10-CM

## 2023-01-06 ASSESSMENT — PATIENT HEALTH QUESTIONNAIRE - PHQ9
SUM OF ALL RESPONSES TO PHQ QUESTIONS 1-9: 16
9. THOUGHTS THAT YOU WOULD BE BETTER OFF DEAD, OR OF HURTING YOURSELF: 0
1. LITTLE INTEREST OR PLEASURE IN DOING THINGS: 3
2. FEELING DOWN, DEPRESSED OR HOPELESS: 3
10. IF YOU CHECKED OFF ANY PROBLEMS, HOW DIFFICULT HAVE THESE PROBLEMS MADE IT FOR YOU TO DO YOUR WORK, TAKE CARE OF THINGS AT HOME, OR GET ALONG WITH OTHER PEOPLE: 2
SUM OF ALL RESPONSES TO PHQ QUESTIONS 1-9: 16
3. TROUBLE FALLING OR STAYING ASLEEP: 3
6. FEELING BAD ABOUT YOURSELF - OR THAT YOU ARE A FAILURE OR HAVE LET YOURSELF OR YOUR FAMILY DOWN: 1
4. FEELING TIRED OR HAVING LITTLE ENERGY: 3
5. POOR APPETITE OR OVEREATING: 0
7. TROUBLE CONCENTRATING ON THINGS, SUCH AS READING THE NEWSPAPER OR WATCHING TELEVISION: 2
8. MOVING OR SPEAKING SO SLOWLY THAT OTHER PEOPLE COULD HAVE NOTICED. OR THE OPPOSITE, BEING SO FIGETY OR RESTLESS THAT YOU HAVE BEEN MOVING AROUND A LOT MORE THAN USUAL: 1
SUM OF ALL RESPONSES TO PHQ QUESTIONS 1-9: 16
SUM OF ALL RESPONSES TO PHQ9 QUESTIONS 1 & 2: 6
SUM OF ALL RESPONSES TO PHQ QUESTIONS 1-9: 16

## 2023-01-06 ASSESSMENT — ANXIETY QUESTIONNAIRES
4. TROUBLE RELAXING: 3
1. FEELING NERVOUS, ANXIOUS, OR ON EDGE: 3
6. BECOMING EASILY ANNOYED OR IRRITABLE: 3
GAD7 TOTAL SCORE: 16
3. WORRYING TOO MUCH ABOUT DIFFERENT THINGS: 1
7. FEELING AFRAID AS IF SOMETHING AWFUL MIGHT HAPPEN: 2
2. NOT BEING ABLE TO STOP OR CONTROL WORRYING: 1
5. BEING SO RESTLESS THAT IT IS HARD TO SIT STILL: 3
IF YOU CHECKED OFF ANY PROBLEMS ON THIS QUESTIONNAIRE, HOW DIFFICULT HAVE THESE PROBLEMS MADE IT FOR YOU TO DO YOUR WORK, TAKE CARE OF THINGS AT HOME, OR GET ALONG WITH OTHER PEOPLE: VERY DIFFICULT

## 2023-01-06 NOTE — PATIENT INSTRUCTIONS
The Jad Zaf[facility]) giving you this message provides team-based care to treat the mind and body. He works directly with your doctor, who will always stay in charge of your care. Most [address] visits are 30 minutes or less. Usually, the [facility] and your doctor continue to see you until you are starting to do better and have a good plan in place for continued progress. Once that happens, most patients follow-up with just their doctor (though the [facility] remains available to you as needed). If you are not improving, or if you desire outside mental health treatment, or if your doctor recommends more specialized help, we will be happy to help you find a mental health specialist in the community. Please also note your health insurance may be billed for [address] visits. Check with your insurance company, and tell the [address] [place] [address] provider, if you have any questions about your [address] [place] [address] coverage. _______________________________________________________________    Trauma changes the brain. According to world-renowned trauma expert and Junior Whitney, once you've been traumatized, you live in a different world. You see the world differently and you experience yourself differently. You're a changed person. So how do we help our clients not just deal with the memory of the past, but actually regain ownership of their mind and body in the present? Traumatic Events    Responses to Trauma   Management   Seeking Help  What Are Traumatic Events? A traumatic event is an incident that causes physical, emotional, spiritual, or psychological harm. The person experiencing the distressing event may feel threatened, anxious, or frightened as a result. In some cases, they may not know how to respond, or may be in denial about the effect such an event has had. The person will need support and time to recover from the traumatic event and regain emotional and mental stability.    Examples of traumatic events include:  death of family member, lover, friend, teacher, or pet   divorce   physical pain or injury (e.g. severe car accident)   serious illness   war   natural disasters   terrorism   moving to a new location   parental abandonment   witnessing a death   rape   domestic abuse   skilled nursing stay  How Do People Respond to Traumatic Events? People respond to traumatic events in different ways. Often there are no visible signs, but people may have serious emotional reactions. Shock and denial shortly after the event is a normal reaction. Shock and denial are often used to protect oneself from the emotional impact of the event. You may feel numb or detached. You may not feel the events full intensity right away. Once you have moved past the initial shock, responses to a traumatic event may vary. Common responses include:  irritability   sudden, dramatic mood changes   anxiety and nervousness   anger   denial   depression   flashbacks or repeated memories of the event   difficulty concentrating   altered sleeping or insomnia   changes in appetite   intense fear that the traumatic event will recur, particularly around anniversaries of the event (or when going back to the scene of the original event)   withdrawal and isolation from day-to-day activities   physical symptoms of stress, such as headaches and nausea   worsening of an existing medical condition  A condition known as post-traumatic stress disorder (PTSD) can sometimes occur after you experience a life-threatening event or witness a death. PTSD is a type of anxiety disorder that affects stress hormones and changes the bodys response to stress. People with this disorder require strong social support and ongoing therapy. Many veterans returning from war suffer from PTSD. PTSD can cause an intense physical and emotional response to any thought or memory of the event. It can last for months or years following trauma.  Experts do not know why some people experience PTSD after a traumatic event while others do not. A history of trauma, along with other physical, genetic, psychological, and social factors may play a role in developing PTSD. How Can You Manage Traumatic Stress? There are several ways to help restore your emotional stability after a traumatic event:  Communicate the experience with family or close friends or in a diary or online journal.   Give yourself time and recognize that you cant control everything. Ask for support from people who care about you or attend a local support group for people who have had a similar experience. Find a support group led by a trained professional who can facilitate discussions. Eat a well-balanced diet, exercise, get adequate rest, and avoid alcohol and drugs. Maintain a daily routine with structured activities. Avoid major life decisions, such as changing careers or moving soon after the event. Pursue hobbies or other interests, but do not overdo it. Spend time with others to avoid becoming withdrawn, even if you do not feel up to it. When Should You Contact a Professional?  You should seek professional help if symptoms persist and interfere with day-to-day activities, school or work performance, or personal relationships. Signs that a child may need professional help to cope with a traumatic event include:  emotional outbursts   aggressive behavior   withdrawal   persistent difficulty in sleeping   continued obsession with the traumatic event   serious problems at school   Psychologists and mental health providers can work with people to find ways to cope with stress. They can help both children and their parents understand how to cope with the emotional impact of a traumatic event.      Evidence-Based Treatment Interventions for PTSD*  Limbic system: Numbing or Hyperarousal--Restore limbic system functioning  Breathing, chanting, moving techniques  Yoga, evonne chi, qigong  Choral singing  sharon Gomez [place] do, michelle, rolando elliott, kickboxing  Tango dancing  Mindfulness, diaphragmatic breathing  Sensory integration  Neurofeedback (Delta-Theta-Alpha-Beta)  Increase wave frequency in frontal lobe  Alpha/theta training  Decrease activity in right temporal lobe (fear center)  Decrease wave frequency in back of brain  Group play  Bodywork: massage, Feldenkrais, craniosacral therapy  Somatic therapy: move in ways didnt/couldnt during the trauma but had the desire/impulse to (hitting, kicking, running) to resolve resigned compliance and freezing  Relationships  Good support network, contact with trusted loved ones, safe physical touch/comfort  Memory: Vivid or missing memory of trauma---Integrate traumatic memories  EMDR  Hypnosis  Internal Family Systems therapy  Identify internal parts and see what each part is protecting (to delineate parts and allow for Self to emerge)  Thanks, redirect in a way that is constructive, effective, readily available, immediate  Avoidance  Tell their story (acknowledge and name it) while staying in touch with feelings/emotions  Interoceptive awareness  Writing and drawing (recount facts, feelings and emotions about it, what impact it had on them)--do not need to share what was written/drawn  Art, music, dance (with writing about it following)  Grounding techniques/kinesthetic awareness  Emptiness/Feeling uncared for  Psychodrama/Psychomotor therapy  Being mirrored as tell story (make witness statements)  Negative cognitions about the event(s)  Theater    *From The Body Keeps the Score by Romel Walton M.D. Families of PTSD  When someone you care about suffers from post-traumatic stress disorder (PTSD), it can leave you feeling overwhelmed. The changes in your loved one can be worrying or even frightening. You may feel angry about whats happening to your family and relationship, or hurt by your loved ones distance and moodiness.  But its important to know is that youre not helpless. Your support can make all the difference in your partner, friend, or family members recovery. With your help, your loved one can overcome PTSD and move on with his or her life. How does PTSD affect relationships? PTSD can take a heavy toll on relationships. It can be hard to understand your loved ones behavior--why they are less affectionate and more volatile. You may feel like youre walking on eggshells or living with a stranger. You may have to take on a bigger share of household tasks, deal with the frustration of a loved one who wont open up, or even deal with anger or disturbing behavior. The symptoms of PTSD can also lead to job loss, substance abuse, and other problems that affect the whole family. Its hard not to take the symptoms of PTSD personally, but its important to remember that a person with PTSD may not always have control over their behavior. Your loved ones nervous system is stuck in a state of constant alert, making them continually feel vulnerable and unsafe. This can lead to anger, irritability, depression, mistrust, and other PTSD symptoms that your loved one cant simply choose to turn off. With the right support from friends and family, though, your loved ones nervous system can become \"unstuck\" and he or she can finally move on from the traumatic event. Helping someone with PTSD tip 1: Provide social support  Its common for people with PTSD to withdraw from friends and family. While its important to respect your loved ones boundaries, your comfort and support can help the person with PTSD overcome feelings of helplessness, grief, and despair. In fact, trauma experts believe that face-to-face support from others is the most important factor in PTSD recovery. How to support your loved one  Knowing how to best demonstrate your love and support for someone with PTSD isnt always easy.  You cant force your loved one to get better, but you can play a major role in the healing process by simply spending time together. Dont pressure your loved one into talking. It can be very difficult for people with PTSD to talk about their traumatic experiences. For some, it can even make things worse. Instead, let them know youre willing to listen when they want to talk, or just hang out when they dont. Comfort for someone with PTSD comes from feeling engaged and accepted by you, not necessarily from talking. Do normal things with your loved one, things that have nothing to do with PTSD or the traumatic experience. Encourage your loved one to participate in rhythmic exercise, seek out friends, and pursue hobbies that bring pleasure. Take a fitness class together, go dancing, or set a regular lunch date with friends and family. Let your loved one take the lead, rather than telling him or her what to do. Everyone with PTSD is different but most people instinctively know what makes them feel calm and safe. Take cues from your loved one as to how you can best provide support and companionship. Manage your own stress. The more calm, relaxed, and focused you are, the better youll be able to help your loved one. Be patient. Recovery is a process that takes time and often involves setbacks. The important thing is to stay positive and maintain support for your loved one. Educate yourself about PTSD. The more you know about the symptoms, effects, and treatment options, the better equipped you'll be to help your loved one, understand what he or she is going through, and keep things in perspective. Accept (and expect) mixed feelings. As you go through the emotional wringer, be prepared for a complicated mix of feelings--some of which youll never want to admit. Just remember, having negative feelings toward your family member doesnt mean you dont love them.   Tip 2: Be a good listener  While you shouldnt push a person with PTSD to talk, if they do choose to share, try to listen without expectations or judgments. Make it clear that youre interested and that you care, but dont worry about giving advice. Its the act of listening attentively that is helpful to your loved one, not what you say. A person with PTSD may need to talk about the traumatic event over and over again. This is part of the healing process, so avoid the temptation to tell your loved one to stop rehashing the past and move on. Some of the things your loved one tells you might be very hard to listen to, but its important to respect their feelings and reactions. If you come across as disapproving or judgmental, they are unlikely to open up to you again. Communication pitfalls to avoid  Don't. .. Give easy answers or blithely tell your loved one everything is going to be okay   Stop your loved one from talking about their feelings or fears   Offer unsolicited advice or tell your loved one what they \"should\" do   Blame all of your relationship or family problems on your loved one's PTSD   Invalidate, minimize, or deny your loved one's traumatic experience   Give ultimatums or make threats or demands   Make your loved one feel weak because they aren't coping as well as others   Tell your loved one they were heydi it wasn't worse   Take over with your own personal experiences or feelings  Tip 3: Rebuild trust and safety  Trauma alters the way a person sees the world, making it seem like a perpetually dangerous and frightening place. It also damages peoples ability to trust others and themselves. Anything you can do to rebuild your loved ones sense of security will contribute to recovery. Express your commitment to the relationship. Let the person know youre here for the long haul so he or she feels loved and supported. Create routines. Structure and predictable schedules can restore a sense of stability and security to people with PTSD, both adults and children.  Creating routines could mean getting your loved one to help with groceries or housework, for example, maintaining regular times for meals, or simply being there for the person. Minimize stress at home. Try to make sure your loved one has space and time for rest and relaxation. Speak of the future and make plans. This can help counteract the common feeling among people with PTSD that their future is limited. Keep your promises. Help rebuild trust by being trustworthy. Be consistent and follow through on the things you say youre going to do. Emphasize your loved ones strengths. Tell your loved one you believe he or she is capable of recovery and point out all your loved ones positive qualities and successes. Encourage your loved one to join a support group. Getting involved with others who have gone through similar traumatic experiences can help some people with PTSD feel less damaged and alone. Tip 4: Anticipate and manage triggers  A trigger is anything--a person, place, thing, or situation--that reminds your loved one of the trauma and sets off a PTSD symptom, such as a flashback. Sometimes, triggers are obvious. For example, a   might be triggered by seeing his combat buddies or by the loud noises that sound like gunfire. Others may take some time to identify and understand, such as hearing a song that was playing when the traumatic event happened, for example, so now that song or even others in the same musical genre are triggers. Similarly, triggers dont have to be external. Internal feelings and sensations can also trigger PTSD symptoms.    Common external PTSD triggers  Sights, sounds, or smells associated with the trauma   People, locations, or things that recall the trauma   Significant dates or times, such as anniversaries or a specific time of day   Molly Myriam (certain types of weather, seasons, etc.)   Conversations or media coverage about trauma or negative news events   Situations that feel confining (stuck in traffic, at the doctor's office, in a crowd)   Relationship, family, school, work, or money pressures or arguments   Funerals, hospitals, or medical treatment  Common internal PTSD triggers  Physical discomfort, such as hunger, thirst, fatigue, sickness, and sexual frustration   Any bodily sensation that recalls the trauma, including pain, old wounds and scars, or a similar injury   Strong emotions, especially feeling helpless, out of control, or trapped   Feelings toward family members, including mixed feelings of love, vulnerability, and resentment  Talking to your loved one about triggers  Ask your loved one about things he or she did in the past in response to a trigger that seemed to help (as well as those that didnt). Then you can come up with a joint game plan for how you will respond in future. Ask what your loved one would like you to do during a nightmare, flashback, or panic attack. Having a plan in place will make the situation less scary for both of you. Gloria Sears also be in a much better position to help your loved one calm down. How to help in the middle of a flashback or panic attack  During a flashback, people often feel a sense of disassociation, as if theyre detached from their own body. Anything you can do to ground them will help. Tell them theyre having a flashback and that even though it feels real, its not actually happening again   Help remind them of their surroundings (for example, ask them to look around the room and describe out loud what they see)   Encourage them to take deep, slow breaths (hyperventilating will increase feelings of panic)   Avoid sudden movements or anything that might startle them   Ask before you touch them. Touching or putting your arms around the person might make him or her feel trapped, which can lead to greater agitation and even violence  Tip 5: Deal with volatility and anger  PTSD can lead to difficulties managing emotions and impulses.  In your loved one, this may manifest as extreme irritability, moodiness, or explosions of rage. Understanding anger in PTSD  People suffering from PTSD live in a constant state of physical and emotional stress. Since they usually have trouble sleeping, it means theyre constantly exhausted, on edge, and physically strung out--increasing the likelihood that theyll overreact to day-to-day stressors. For many people with PTSD, anger can also be a cover for other feelings such as grief, helplessness, or guilt. Anger makes them feel powerful, instead of weak and vulnerable. For others, they try to suppress their anger until it erupts when you least expect it. Watch for signs that your loved one is angry such as clenching jaw or fists, talking louder, or getting agitated. Take steps to defuse the situation as soon as you see the initial warning signs. Try to remain calm. During an emotional outburst, do your best to stay calm. This will communicate to your loved one that you are safe and prevent the situation from escalating. Give the person space. Avoid crowding or grabbing the person. This can make a traumatized person feel threatened. Ask how you can help. For example: What can I do to help you right now?  You can also suggest a time out or change of scenery. Put safety first. If the person gets more upset despite your attempts to calm him or her down, leave the house or lock yourself in a room. Call 911 if you fear that your loved one may hurt himself or others. Learning how to control anger  Anger is a normal, healthy emotion, but when chronic, explosive anger spirals out of control, it can have serious consequences on a persons relationships, health, and state of mind. Your loved one can get anger under control by exploring the root issues and learning healthier ways to express their feelings.  See: Anger Management  Tip 6: Take care of yourself  Letting your family members PTSD dominate your life while ignoring your own needs is a surefire recipe for burnout. In order to have the strength to be there for your loved one over the long haul, you have to nurture and care for yourself. Take care of your physical needs: get enough sleep, exercise regularly, eat properly, and look after any medical issues. Cultivate your own support system. Lean on other family members, trusted friends, your own therapist or support group, or your nelly community. Talking about your feelings and what youre going through can be very cathartic. Make time for your own life. Dont give up friends, hobbies, or activities that make you happy. Its important to have things in your life that you look forward to. Spread the responsibility. Ask other family members and friends for assistance so you can take a break. You may also want to seek out respite services in your community. Set boundaries. Be realistic about what youre capable of giving. Know your limits, communicate them to your family member and others involved, and stick to them. Trauma can be \"contagious\"  Caring for someone with PTSD can lead to the potential for secondary traumatization. You can develop your own symptoms from listening to trauma stories or being exposed to disturbing symptoms like flashbacks. The more depleted and overwhelmed you feel, the greater the risk that you may become traumatized. Support for people taking care of veterans  If the person youre caring for is a [address], financial and caregiving support may be available. Visit VA Caregiver Support to explore your options, or call Coaching into Care at (403) 431-7895. For [place] in other countries, see the Resources section below for helplines.   Related articles    Emotional and Psychological Trauma: Healing from Trauma and Moving On    PTSD in [place]: Symptoms, Treatment, and Self-Help    Alcoholism and Alcohol Abuse: Recognizing the Signs and Symptoms of a Drinking Problem  Resources and references  General help for family members  [address] [facility] - This 24-hour [place] hotline for anyone in emotional distress: 6-367-996-TALK (7537). IASP - Find crisis centers and suicide prevention helplines around the world. (International Association for Suicide Prevention). [facility] organization that helps people understand, recover from, and treat traumatic stress. Includes a referral list of therapists for PTSD. [employer] on Mental Illness ([facility]) - Call the Helpline at 0-034-113-[facility] (1944) or check out the [employer] for caregivers of people with severe mental illness in the [place]  Help for [place] veterans' family members  [facility] - Call (759) [phone] for free, confidential coaching designed to help family members learn how to talk to their  about their concerns and about treatment options. [address] [place] [address] confidential, free hotline for veterans and their families and friends. Call 9-805.267.1119 (Press 1) or connect via chat or text (813866). Help for Veterans with PTSD - Learn how to earn how to earn how to enroll for [facility] and get an assessment. [facility] for PTSD)  Give an [place] [address] organization that offers free mental health services to [address] [employer] personnel and their families affected by the ongoing conflicts in [place] and [place]. 24/7 [address] [place] for [facility] Brain Injury - Get help for traumatic brain injury and other psychological health issues. Call 7-509.869.5521 or connect through chat or email. ([address], [facility])  A Guide to [address] [employer] (PDF) - Comprehensive guide to VA mental health services, including programs and resources for PTSD. StartYourRecovery. org is a free, confidential tool that helps individuals take steps toward a healthy relationship with drugs and alcohol.  It was developed with the input of leading clinicians, experts from the [address] [place] [address], and people in recovery themselves. Help for veterans and their families in other countries  [place] veterans: visit Operational Stress Injury Social Support ([address] [facility]) or call 4-307.281.7768 to talk to a peer who has been through similar experiences. [facility] veterans: visit Combat Stress or call the 24-hour helpline [phone]. [facility] veterans: visit [address] [place] [address] ([facility]) or call [phone] information about PTSD in veterans and the family  Effects of PTSD on Family - When someone in the family has PTSD, everyone feels the effects. Learn about common feelings and reactions among family members. [facility] for PTSD)  About Face - Hear the stories of veterans who live with PTSD. Listen to personal experiences about how PTSD affected their families and how treatment turned things around. Returning from the War Zone (PDF) - Learn about issues families face when a spouse returns from war and what can be done to prepare for the reunion and cope with the transition to civilian life. Find PTSD treatment and support groups in the [place] Find Support - Call the [facility] helpline at 5-300-368-GKZR to find a support group near you. ([employer] [place] [employer]ness)  Trauma Treatment Programs (PDF) - Directory of trauma resources listed by state. [employer])  Find PTSD treatment and support groups in other countries  Where to Get Help - Find different treatment and support options in the [facility]. (PTSD [facility])  Find Help - Find PTSD recovery resources in [facility]. ([facility])  Find Your Local [facility] - Contact your local [facility] division to find support and treatment options in [place] ([facility]). ([facility])  Was this article helpful? Yes No     Authors: Priti Wagner M.A., and Sydnee Olsen. Last updated: October 2017. _______________________________________________________________
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
## 2023-01-10 DIAGNOSIS — M51.36 LUMBAR DEGENERATIVE DISC DISEASE: Primary | ICD-10-CM

## 2023-01-10 DIAGNOSIS — M51.26 LUMBAR DISC HERNIATION: ICD-10-CM

## 2023-01-12 ENCOUNTER — INITIAL CONSULT (OUTPATIENT)
Dept: PAIN MANAGEMENT | Age: 48
End: 2023-01-12
Payer: COMMERCIAL

## 2023-01-12 VITALS
HEIGHT: 65 IN | WEIGHT: 200 LBS | SYSTOLIC BLOOD PRESSURE: 118 MMHG | DIASTOLIC BLOOD PRESSURE: 80 MMHG | TEMPERATURE: 98 F | BODY MASS INDEX: 33.32 KG/M2

## 2023-01-12 DIAGNOSIS — M54.16 LUMBAR RADICULOPATHY: Primary | ICD-10-CM

## 2023-01-12 PROCEDURE — G8427 DOCREV CUR MEDS BY ELIG CLIN: HCPCS | Performed by: PAIN MEDICINE

## 2023-01-12 PROCEDURE — G8417 CALC BMI ABV UP PARAM F/U: HCPCS | Performed by: PAIN MEDICINE

## 2023-01-12 PROCEDURE — G8484 FLU IMMUNIZE NO ADMIN: HCPCS | Performed by: PAIN MEDICINE

## 2023-01-12 PROCEDURE — 99203 OFFICE O/P NEW LOW 30 MIN: CPT | Performed by: PAIN MEDICINE

## 2023-01-12 PROCEDURE — 4004F PT TOBACCO SCREEN RCVD TLK: CPT | Performed by: PAIN MEDICINE

## 2023-01-12 RX ORDER — OXYCODONE HYDROCHLORIDE AND ACETAMINOPHEN 5; 325 MG/1; MG/1
1 TABLET ORAL EVERY 4 HOURS PRN
COMMUNITY

## 2023-01-12 RX ORDER — PREDNISONE 2.5 MG
2.5 TABLET ORAL DAILY
COMMUNITY

## 2023-01-12 NOTE — PROGRESS NOTES
[facility]  Neurosurgery and Pain [facility]  [address] [place] , [address] [facility], [place]: [phone]  F: [phone]        Eryn Bradshaw  (1975)    1/12/2023    Subjective:     Eryn Bradshaw is 41 y.o. female who complains today of:     Chief Complaint   Patient presents with    Back Pain     Lower back, down left leg     Leg Pain     Left      Patient here today with back pain and left lower extremity pain numbness. She is here with her father apparently she had this appointment set up quite some time ago she was recently in the [facility] emergency room had MRI very recently which shows a large left L5-S1 disc herniation with impingement. 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 Laying is the best standing walking bothers her the most.  OARRS was reviewed she is on high-dose gabapentin from previous is on steroids currently with no recent physical therapy she has an appointment with the [facility]7 Card clinic neurosurgeon later today. Plan: Her MRI report reveals large disc herniation consistent with her symptoms she should keep the appointment with the clinic clinic neurosurgeon. She is already on steroids and gabapentin unfortunately do not have much to offer at this time. They are in agreement with the plan. Questions answered chart was reviewed.     Allergies:  Seasonal    Past Medical History:   Diagnosis Date    Chronic back pain     Chronic headaches     Depression      Past Surgical History:   Procedure Laterality Date    CHOLECYSTECTOMY       Family History   Problem Relation Age of Onset    Arthritis Mother      Social History     Socioeconomic History    Marital status: Single     Spouse name: Not on file    Number of children: Not on file    Years of education: Not on file    Highest education level: Not on file   Occupational History    Not on file   Tobacco Use    Smoking status: Every Day     Packs/day: 1.00     Types: Cigarettes    Smokeless tobacco: Never   Vaping Use    Vaping Use: Never used   Substance and Sexual Activity    Alcohol use: Not Currently    Drug use: Not on file    Sexual activity: Not on file   Other Topics Concern    Not on file   Social History Narrative    Not on file     Social Determinants of Health     Financial Resource Strain: Low Risk     Difficulty of Paying Living Expenses: Not hard at all   Food Insecurity: No Food Insecurity    Worried About [address] in the Last Year: Never true    [address] [facility] [employer] in the Last Year: Never true   Transportation Needs: No Transportation Needs    Lack of Transportation (Medical): No    Lack of Transportation (Non-Medical): No   Physical Activity: Not on file   Stress: Not on file   Social Connections: Not on file   Intimate Partner Violence: Not on file   Housing Stability: Not on file       Current Outpatient Medications on File Prior to Visit   Medication Sig Dispense Refill    predniSONE (DELTASONE) 2.5 MG tablet Take 2.5 mg by mouth daily      oxyCODONE-acetaminophen (PERCOCET) 5-325 MG per tablet Take 1 tablet by mouth every 4 hours as needed for Pain.      gabapentin (NEURONTIN) 800 MG tablet Take 1 tablet by mouth 4 times daily for 60 days. 240 tablet 3    escitalopram (LEXAPRO) 10 MG tablet Take 1 tablet by mouth daily 30 tablet 3    rosuvastatin (CRESTOR) 40 MG tablet Take 1 tablet by mouth daily 90 tablet 1    dexamethasone (DECADRON) 2 MG tablet       traZODone (DESYREL) 50 MG tablet Take 1 tablet by mouth nightly 30 tablet 3    EPINEPHrine (EPIPEN) 0.3 MG/0.3ML SOAJ injection Inject 0.3 mg into the muscle once      Urea (CARMOL) 40 % cream       omeprazole (PRILOSEC) 20 MG delayed release capsule Take 1 capsule by mouth daily 60 capsule 3     No current facility-administered medications on file prior to visit.            HPI    Review of Systems   All other systems reviewed and are negative. Objective:     Vitals:  /80 (Site: Left Upper Arm, Position: Sitting)   Temp 98 °F (36.7 °C)   Ht 5' 5\" (1.651 m)   Wt 200 lb (90.7 kg)   BMI 33.28 kg/m² Pain Score:   4      Physical Exam  Patient alert and oriented times three, recent and remote memory intact, mood and affect normal, judgement and insight normal. Strength functional for ambulation. Balance and coordinational functional. Visualized skin intact. No visualized cyanosis, pulses intact. Cranial nerves 2-12 grossly intact. No obvious upper motor neuron signs seen. Sensation intact to light touch. +SLr LLE    Assessment:      Diagnosis Orders   1.  Lumbar radiculopathy            Plan:

## 2023-01-22 PROBLEM — G56.03 BILATERAL CARPAL TUNNEL SYNDROME: Status: ACTIVE | Noted: 2023-01-22

## 2023-01-25 ENCOUNTER — TELEPHONE (OUTPATIENT)
Dept: PRIMARY CARE CLINIC | Age: 48
End: 2023-01-25

## 2023-01-25 NOTE — TELEPHONE ENCOUNTER
MUSTAPHA pt just got out of hospital yesterday 1/9/2023-1/11/2023 [facility] chan, had back surgery on 1/23/2023 at [facility], she threw her back out first week in January pain increased left leg numb was unable to put any weight on leg,  L5 S1 bulging disc pressing on nerve so she had surgery to correct this. Has to make another appointment with Dr. Adelfo Daugherty, making an appointment to see ortho. She has an appointment for 2/9/2023 to follow up with Dr Eliza Marquez.

## 2023-02-09 ENCOUNTER — OFFICE VISIT (OUTPATIENT)
Dept: PRIMARY CARE CLINIC | Age: 48
End: 2023-02-09
Payer: COMMERCIAL

## 2023-02-09 VITALS
DIASTOLIC BLOOD PRESSURE: 66 MMHG | BODY MASS INDEX: 34.45 KG/M2 | RESPIRATION RATE: 18 BRPM | WEIGHT: 206.8 LBS | HEIGHT: 65 IN | OXYGEN SATURATION: 97 % | SYSTOLIC BLOOD PRESSURE: 116 MMHG | HEART RATE: 66 BPM | TEMPERATURE: 97.8 F

## 2023-02-09 DIAGNOSIS — D64.9 POSTOPERATIVE ANEMIA: ICD-10-CM

## 2023-02-09 DIAGNOSIS — Z98.890 S/P LUMBAR DISCECTOMY: ICD-10-CM

## 2023-02-09 DIAGNOSIS — F43.23 ADJUSTMENT DISORDER WITH MIXED ANXIETY AND DEPRESSED MOOD: Primary | ICD-10-CM

## 2023-02-09 PROCEDURE — 99214 OFFICE O/P EST MOD 30 MIN: CPT | Performed by: INTERNAL MEDICINE

## 2023-02-09 PROCEDURE — 4004F PT TOBACCO SCREEN RCVD TLK: CPT | Performed by: INTERNAL MEDICINE

## 2023-02-09 PROCEDURE — G8427 DOCREV CUR MEDS BY ELIG CLIN: HCPCS | Performed by: INTERNAL MEDICINE

## 2023-02-09 PROCEDURE — G8417 CALC BMI ABV UP PARAM F/U: HCPCS | Performed by: INTERNAL MEDICINE

## 2023-02-09 PROCEDURE — G8484 FLU IMMUNIZE NO ADMIN: HCPCS | Performed by: INTERNAL MEDICINE

## 2023-02-09 RX ORDER — DOCUSATE SODIUM 50 MG AND SENNOSIDES 8.6 MG 8.6; 5 MG/1; MG/1
TABLET, FILM COATED ORAL
COMMUNITY
Start: 2023-01-10

## 2023-02-09 RX ORDER — ORPHENADRINE CITRATE 100 MG/1
TABLET, EXTENDED RELEASE ORAL
COMMUNITY
Start: 2023-02-08

## 2023-02-09 RX ORDER — FAMOTIDINE 20 MG/1
20 TABLET, FILM COATED ORAL 2 TIMES DAILY PRN
COMMUNITY
Start: 2023-01-10

## 2023-02-09 RX ORDER — ESCITALOPRAM OXALATE 20 MG/1
20 TABLET ORAL DAILY
Qty: 60 TABLET | Refills: 3 | Status: SHIPPED | OUTPATIENT
Start: 2023-02-09

## 2023-02-09 RX ORDER — CYCLOBENZAPRINE HCL 5 MG
TABLET ORAL
COMMUNITY
Start: 2023-02-05

## 2023-02-09 RX ORDER — OXYCODONE HYDROCHLORIDE 5 MG/1
TABLET ORAL
COMMUNITY
Start: 2023-01-10

## 2023-02-09 SDOH — ECONOMIC STABILITY: FOOD INSECURITY: WITHIN THE PAST 12 MONTHS, THE FOOD YOU BOUGHT JUST DIDN'T LAST AND YOU DIDN'T HAVE MONEY TO GET MORE.: NEVER TRUE

## 2023-02-09 SDOH — ECONOMIC STABILITY: INCOME INSECURITY: HOW HARD IS IT FOR YOU TO PAY FOR THE VERY BASICS LIKE FOOD, HOUSING, MEDICAL CARE, AND HEATING?: NOT HARD AT ALL

## 2023-02-09 SDOH — ECONOMIC STABILITY: HOUSING INSECURITY
IN THE LAST 12 MONTHS, WAS THERE A TIME WHEN YOU DID NOT HAVE A STEADY PLACE TO SLEEP OR SLEPT IN A SHELTER (INCLUDING NOW)?: NO

## 2023-02-09 SDOH — ECONOMIC STABILITY: FOOD INSECURITY: WITHIN THE PAST 12 MONTHS, YOU WORRIED THAT YOUR FOOD WOULD RUN OUT BEFORE YOU GOT MONEY TO BUY MORE.: SOMETIMES TRUE

## 2023-02-09 ASSESSMENT — ENCOUNTER SYMPTOMS
COUGH: 0
NAUSEA: 0
WHEEZING: 0
SHORTNESS OF BREATH: 0
BACK PAIN: 0
ABDOMINAL PAIN: 0
VOMITING: 0
DIARRHEA: 0

## 2023-02-09 NOTE — PROGRESS NOTES
Subjective:      Patient ID: Yas Rodarte is a 41 y.o. female      Follow up   HPI  Pt presents for follow up regarding management of anxiety and depression. Pt on Lexapro 10mg. Sill not well controlled despite leg pain improvement post lumbar discectomy. Attests to poor sleep, energy and appetite. No suicidal ideations or hallucinations. Following with psychologist. No longer taking trazodone, per her choice. Seen by neurology for MS and lumbar degenerative disease. Decadron and  Aubagio. EMG showed right carpal tunnel and ulnar neuropathy. Will follow with Dr. Iesha Cosme. S/p left L5-S1 discectomy, root foraminotomy on 1/23/2023. {Post op anemia. Last Hb 7.6. Past Medical History:   Diagnosis Date    Chronic back pain     Chronic headaches     Depression      Past Surgical History:   Procedure Laterality Date    CHOLECYSTECTOMY       Social History     Socioeconomic History    Marital status: Single     Spouse name: Not on file    Number of children: Not on file    Years of education: Not on file    Highest education level: Not on file   Occupational History    Not on file   Tobacco Use    Smoking status: Every Day     Packs/day: 1.00     Types: Cigarettes    Smokeless tobacco: Never   Vaping Use    Vaping Use: Never used   Substance and Sexual Activity    Alcohol use: Not Currently    Drug use: Not on file    Sexual activity: Not on file   Other Topics Concern    Not on file   Social History Narrative    Not on file     Social Determinants of Health     Financial Resource Strain: Low Risk     Difficulty of Paying Living Expenses: Not hard at all   Food Insecurity: Food Insecurity Present    Worried About [address] in the Last Year: Sometimes true    [address] in the Last Year: Never true   Transportation Needs: No Transportation Needs    Lack of Transportation (Medical): No    Lack of Transportation (Non-Medical):  No   Physical Activity: Not on file   Stress: Not on file   Social Connections: Not on file   Intimate Partner Violence: Not on file   Housing Stability: Unknown    Unable to Pay for Housing in the Last Year: Not on file    Number of Places Lived in the Last Year: Not on file    Unstable Housing in the Last Year: No     Family History   Problem Relation Age of Onset    Arthritis Mother      Allergies:  Seasonal  Patient Active Problem List   Diagnosis    Bilateral carpal tunnel syndrome     Current Outpatient Medications on File Prior to Visit   Medication Sig Dispense Refill    famotidine (PEPCID) 20 MG tablet Take 20 mg by mouth 2 times daily as needed      orphenadrine (NORFLEX) 100 MG extended release tablet       oxyCODONE (ROXICODONE) 5 MG immediate release tablet TAKE 1 TABLET BY MOUTH EVERY 4 HOURS FOR UP TO 7 DAYS AS NEEDED      STIMULANT LAXATIVE 8.6-50 MG per tablet TAKE 1 TABLET BY MOUTH TWICE DAILY      predniSONE (DELTASONE) 2.5 MG tablet Take 2.5 mg by mouth daily      gabapentin (NEURONTIN) 800 MG tablet Take 1 tablet by mouth 4 times daily for 60 days. 240 tablet 3    rosuvastatin (CRESTOR) 40 MG tablet Take 1 tablet by mouth daily 90 tablet 1    EPINEPHrine (EPIPEN) 0.3 MG/0.3ML SOAJ injection Inject 0.3 mg into the muscle once      Urea (CARMOL) 40 % cream       omeprazole (PRILOSEC) 20 MG delayed release capsule Take 1 capsule by mouth daily 60 capsule 3    cyclobenzaprine (FLEXERIL) 5 MG tablet TAKE 1 TABLET BY MOUTH EVERY 8 HOURS AS NEEDED FOR MUSCLE SPASM. DO NOT TAKE THIS IF BLOOD PRESSURE IS. LESS THAN 110      oxyCODONE-acetaminophen (PERCOCET) 5-325 MG per tablet Take 1 tablet by mouth every 4 hours as needed for Pain. dexamethasone (DECADRON) 2 MG tablet        No current facility-administered medications on file prior to visit. Review of Systems   Respiratory:  Negative for cough, shortness of breath and wheezing. Cardiovascular:  Negative for chest pain. Gastrointestinal:  Negative for abdominal pain, diarrhea, nausea and vomiting. Endocrine: Negative for cold intolerance and heat intolerance. Genitourinary:  Negative for dysuria and frequency. Musculoskeletal:  Negative for arthralgias and back pain. Neurological:  Negative for dizziness and light-headedness. Objective:   /66   Pulse 66   Temp 97.8 °F (36.6 °C)   Resp 18   Ht 5' 5\" (1.651 m)   Wt 206 lb 12.8 oz (93.8 kg)   SpO2 97%   BMI 34.41 kg/m²     Physical Exam  Constitutional:       General: She is not in acute distress. Appearance: She is not diaphoretic. Cardiovascular:      Rate and Rhythm: Normal rate and regular rhythm. Pulses: Normal pulses. Heart sounds: Normal heart sounds, S1 normal and S2 normal.   Pulmonary:      Effort: Pulmonary effort is normal. No respiratory distress. Breath sounds: Normal breath sounds. No wheezing or rales. Chest:      Chest wall: No tenderness. Abdominal:      General: Bowel sounds are normal.      Tenderness: There is no abdominal tenderness. Skin:     Coloration: Skin is not pale. Neurological:      General: No focal deficit present. Mental Status: She is alert. Cranial Nerves: No cranial nerve deficit. Motor: No weakness. Gait: Gait normal.   Psychiatric:         Mood and Affect: Mood normal.         Thought Content: Thought content normal.     Assessment:       Diagnosis Orders   1. Adjustment disorder with mixed anxiety and depressed mood Inadequately Controlled escitalopram (LEXAPRO) 20 MG tablet    will increase Lexapro dose       2. S/P lumbar discectomy Controlled     leg pain much improved       3.  Postoperative anemia  CBC with Auto Differential          Plan:      Orders Placed This Encounter   Procedures    CBC with Auto Differential     Standing Status:   Future     Number of Occurrences:   1     Standing Expiration Date:   2/9/2024     Orders Placed This Encounter   Medications    escitalopram (LEXAPRO) 20 MG tablet     Sig: Take 1 tablet by mouth daily Dispense:  60 tablet     Refill:  3     DC Lexapro 10mg     Return in about 2 weeks (around 2/23/2023) for to r/o sucidal ideation, with PCP.

## 2023-02-20 ENCOUNTER — OFFICE VISIT (OUTPATIENT)
Dept: ORTHOPEDIC SURGERY | Age: 48
End: 2023-02-20
Payer: COMMERCIAL

## 2023-02-20 DIAGNOSIS — M77.11 RIGHT LATERAL EPICONDYLITIS: Primary | ICD-10-CM

## 2023-02-20 PROCEDURE — G8484 FLU IMMUNIZE NO ADMIN: HCPCS | Performed by: STUDENT IN AN ORGANIZED HEALTH CARE EDUCATION/TRAINING PROGRAM

## 2023-02-20 PROCEDURE — 20551 NJX 1 TENDON ORIGIN/INSJ: CPT | Performed by: STUDENT IN AN ORGANIZED HEALTH CARE EDUCATION/TRAINING PROGRAM

## 2023-02-20 PROCEDURE — MISCD86 TENNIS ELBOW STRAP-BREG: Performed by: STUDENT IN AN ORGANIZED HEALTH CARE EDUCATION/TRAINING PROGRAM

## 2023-02-20 PROCEDURE — 99214 OFFICE O/P EST MOD 30 MIN: CPT | Performed by: STUDENT IN AN ORGANIZED HEALTH CARE EDUCATION/TRAINING PROGRAM

## 2023-02-20 PROCEDURE — 4004F PT TOBACCO SCREEN RCVD TLK: CPT | Performed by: STUDENT IN AN ORGANIZED HEALTH CARE EDUCATION/TRAINING PROGRAM

## 2023-02-20 PROCEDURE — G8417 CALC BMI ABV UP PARAM F/U: HCPCS | Performed by: STUDENT IN AN ORGANIZED HEALTH CARE EDUCATION/TRAINING PROGRAM

## 2023-02-20 PROCEDURE — G8427 DOCREV CUR MEDS BY ELIG CLIN: HCPCS | Performed by: STUDENT IN AN ORGANIZED HEALTH CARE EDUCATION/TRAINING PROGRAM

## 2023-02-20 RX ORDER — LIDOCAINE HYDROCHLORIDE 10 MG/ML
2 INJECTION, SOLUTION INFILTRATION; PERINEURAL ONCE
Status: COMPLETED | OUTPATIENT
Start: 2023-02-20 | End: 2023-02-20

## 2023-02-20 RX ORDER — TRIAMCINOLONE ACETONIDE 40 MG/ML
40 INJECTION, SUSPENSION INTRA-ARTICULAR; INTRAMUSCULAR ONCE
Status: COMPLETED | OUTPATIENT
Start: 2023-02-20 | End: 2023-02-20

## 2023-02-20 RX ADMIN — TRIAMCINOLONE ACETONIDE 40 MG: 40 INJECTION, SUSPENSION INTRA-ARTICULAR; INTRAMUSCULAR at 09:46

## 2023-02-20 RX ADMIN — LIDOCAINE HYDROCHLORIDE 2 ML: 10 INJECTION, SOLUTION INFILTRATION; PERINEURAL at 09:45

## 2023-02-20 ASSESSMENT — ENCOUNTER SYMPTOMS
BACK PAIN: 1
RESPIRATORY NEGATIVE: 1
GASTROINTESTINAL NEGATIVE: 1
EYES NEGATIVE: 1
ALLERGIC/IMMUNOLOGIC NEGATIVE: 1

## 2023-02-20 NOTE — PROGRESS NOTES
Orthopedic Surgery and Sports Medicine    Subjective:      Patient ID: Tristen Rangel is a 41 y.o. female who presents today for:  Chief Complaint   Patient presents with    Follow-up     Follow up visit for Cubital tunnel on RT elbow, 1.5 months out, states that it has gotten worse and can't carry anything on the RT hand, also states that the RT hand locks up and and to physically open hand her self        HPI  Carlee Lofton is a 44-year-old female who presents today for follow-up evaluation of her right arm. In brief she reports pain, numbness, tingling, weakness in the right elbow, forearm, and hand for approximately 3 to 4 months. She also previously reported numbness in the small finger and ring finger. She reports weakness in the hand and has been dropping things. In the interim she has had an EMG performed and is here for follow-up. Today she reports mostly pain over the lateral aspect of the elbow with extension into the forearm. Pain is worse with certain movements of the wrist and elbow. She states that the numbness in her right hand has resolved. She reports cramping in the right hand. She recently had low back surgery at the [facility] clinic approximately 3 weeks ago. Previous treatment: wrist braces  NSAIDs: Yes  Physical therapy: No     Hand Dominance: left  Workers Compensation:   Have you missed work for this issue? No  Is this issue being addressed under a worker's compensation claim?  No       Past Medical History:   Diagnosis Date    Chronic back pain     Chronic headaches     Depression       Past Surgical History:   Procedure Laterality Date    CHOLECYSTECTOMY       Social History     Socioeconomic History    Marital status: Single     Spouse name: Not on file    Number of children: Not on file    Years of education: Not on file    Highest education level: Not on file   Occupational History    Not on file   Tobacco Use    Smoking status: Every Day     Packs/day: 1.00     Types: Cigarettes    Smokeless tobacco: Never   Vaping Use    Vaping Use: Never used   Substance and Sexual Activity    Alcohol use: Not Currently    Drug use: Not on file    Sexual activity: Not on file   Other Topics Concern    Not on file   Social History Narrative    Not on file     Social Determinants of Health     Financial Resource Strain: Low Risk     Difficulty of Paying Living Expenses: Not hard at all   Food Insecurity: Food Insecurity Present    Worried About [employer] in the Last Year: Sometimes true    Ran Out of Food in the Last Year: Never true   Transportation Needs: No Transportation Needs    Lack of Transportation (Medical): No    Lack of Transportation (Non-Medical): No   Physical Activity: Not on file   Stress: Not on file   Social Connections: Not on file   Intimate Partner Violence: Not on file   Housing Stability: Unknown    Unable to Pay for Housing in the Last Year: Not on file    Number of Places Lived in the Last Year: Not on file    Unstable Housing in the Last Year: No     Family History   Problem Relation Age of Onset    Arthritis Mother      Allergies   Allergen Reactions    Seasonal      Other reaction(s): Other: See Comments  Watering eyes, congestion     Current Outpatient Medications on File Prior to Visit   Medication Sig Dispense Refill    cyclobenzaprine (FLEXERIL) 5 MG tablet TAKE 1 TABLET BY MOUTH EVERY 8 HOURS AS NEEDED FOR MUSCLE SPASM. DO NOT TAKE THIS IF BLOOD PRESSURE IS.  LESS THAN 110      famotidine (PEPCID) 20 MG tablet Take 20 mg by mouth 2 times daily as needed      orphenadrine (NORFLEX) 100 MG extended release tablet       oxyCODONE (ROXICODONE) 5 MG immediate release tablet TAKE 1 TABLET BY MOUTH EVERY 4 HOURS FOR UP TO 7 DAYS AS NEEDED      STIMULANT LAXATIVE 8.6-50 MG per tablet TAKE 1 TABLET BY MOUTH TWICE DAILY      escitalopram (LEXAPRO) 20 MG tablet Take 1 tablet by mouth daily 60 tablet 3    predniSONE (DELTASONE) 2.5 MG tablet Take 2.5 mg by mouth daily      oxyCODONE-acetaminophen (PERCOCET) 5-325 MG per tablet Take 1 tablet by mouth every 4 hours as needed for Pain. dexamethasone (DECADRON) 2 MG tablet       gabapentin (NEURONTIN) 800 MG tablet Take 1 tablet by mouth 4 times daily for 60 days. 240 tablet 3    rosuvastatin (CRESTOR) 40 MG tablet Take 1 tablet by mouth daily 90 tablet 1    EPINEPHrine (EPIPEN) 0.3 MG/0.3ML SOAJ injection Inject 0.3 mg into the muscle once      Urea (CARMOL) 40 % cream       omeprazole (PRILOSEC) 20 MG delayed release capsule Take 1 capsule by mouth daily 60 capsule 3     No current facility-administered medications on file prior to visit. Review of Systems   Constitutional: Negative. HENT: Negative. Eyes: Negative. Respiratory: Negative. Cardiovascular: Negative. Gastrointestinal: Negative. Endocrine: Negative. Genitourinary: Negative. Musculoskeletal:  Positive for arthralgias, back pain and myalgias. Skin: Negative. Allergic/Immunologic: Negative. Neurological: Negative. Negative for numbness. Hematological: Negative. Psychiatric/Behavioral: Negative. Objective: There were no vitals taken for this visit. Physical Exam:  Ortho Exam    Right upper extremity: There is tenderness to palpation over the lateral epicondyle and origin of the wrist extensor musculature. Elbow range of motion is full from 0 to 130 degrees. Full pronation and supination with pain. She has pain in the lateral elbow with resisted wrist extension. In the hand she has full range of motion of the hand and wrist.  Sensation intact to light touch throughout the median, radial, ulnar nerve distributions. The hand is warm and well-perfused. Radiographs and Laboratory Studies:     Diagnostic Imaging Studies:    EMG dated 1/3/2023  IMPRESSION:  1. 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 This appears to be asymptomatic.   2.  The patient's symptoms appears to be in the fourth and fifth fingers  on the right. The nerve conduction studies are normal.  There are no  conduction blocks. There are no motor findings. The patient has  clinically an ulnar neuropathy at the elbow, but no damage has occurred. Of note, the patient has a borderline right median nerve neuropathy at  the wrist suggesting carpal tunnel syndrome, which is also asymptomatic. Laboratory Studies:   Lab Results   Component Value Date    WBC 7.0 02/09/2023    HGB 9.3 (L) 02/09/2023    HCT 29.6 (L) 02/09/2023    MCV 80.1 02/09/2023     (H) 02/09/2023     No results found for: SEDRATE  No results found for: CRP    Time Out -right elbow lateral epicondyle injection  [x] Patient Verified  [x] Site Verified  [x] Laterality Verified  [x] Procedure Verified    Before aspiration/injection, the risks and benefits of a cortisone injection including infection, local skin irritation, skin atrophy, continued pain/discomfort, elevated blood sugar, burning, failure to relieve pain, possible late infection were discussed with patient. Patient verbalized understanding and wanted to proceed with planned procedure. After prepping and draping the right elbow in the usual sterile fashion, 1cc of 40 mg/ml kenalog with 2cc of 1% lidocaine were injected into the region of the right lateral epicondyle without any complications. Patient tolerated this well. Post-procedure discomfort can be alleviated with additional medications/ice/elevation/rest over the first 24 hours as recommended. The patient tolerated the procedure well. Assessment:      Diagnosis Orders   1. Right lateral epicondylitis  DE INJECTION SINGLE TENDON ORIGIN/INSERTION    [facility]        Chantelle Huston is a 41 y.o. female with a history and exam consistent with right elbow lateral epicondylitis. Plan:     I had a discussion with the patient regarding her exam, EMG findings, diagnosis. Overall her EMG is relatively unremarkable. She has mild to moderate carpal tunnel syndrome bilaterally. No abnormalities within the ulnar nerve. The numbness in her small and ring finger has resolved. Her clinical exam is significant for her pain and tenderness over the lateral epicondyle with pain with resisted wrist extension. This is consistent with lateral epicondylitis. We had a discussion regarding treatment options for lateral epicondylitis. Conservative treatment measures include activity modification, physical therapy, bracing, anti-inflammatory medication, and a cortisone injection. She states that she is currently in physical therapy for her recent low back surgery. We discussed that physical therapy for her elbow may be necessary if her symptoms linger. She has been taking intermittent anti-inflammatory medication without much relief. She was given a counterforce elbow strap. She was also given a cortisone injection into the right lateral epicondyle. She should follow-up with me in clinic in 2-3 months if symptoms persist.    Orders Placed This Encounter   Procedures    TENNIS ELBOW STRAP-BREG     Patient was supplied a Breg Tennis Elbow Strap. This retail item was supplied to provide functional support and assist in protecting the affected area. Verbal and written instructions for the use of and application of this item were provided. The patient was educated and fit by a healthcare professional with expert knowledge and specialization in brace application. They were instructed to contact the office immediately should the equipment result in increased pain, decreased sensation, increased swelling or worsening of the condition. MN INJECTION SINGLE TENDON ORIGIN/INSERTION     Orders Placed This Encounter   Medications    lidocaine 1 % injection 2 mL    triamcinolone acetonide (KENALOG-40) injection 40 mg     Return in about 2 months (around 4/20/2023).     Andry Cruz MD

## 2023-02-22 ENCOUNTER — HOSPITAL ENCOUNTER (OUTPATIENT)
Age: 48
Setting detail: OUTPATIENT SURGERY
Discharge: HOME | End: 2023-02-22
Attending: INTERNAL MEDICINE | Admitting: INTERNAL MEDICINE
Payer: COMMERCIAL

## 2023-02-22 ENCOUNTER — ANESTHESIA (OUTPATIENT)
Dept: ENDOSCOPY | Age: 48
End: 2023-02-22
Payer: COMMERCIAL

## 2023-02-22 ENCOUNTER — ANESTHESIA EVENT (OUTPATIENT)
Dept: ENDOSCOPY | Age: 48
End: 2023-02-22
Payer: COMMERCIAL

## 2023-02-22 VITALS
BODY MASS INDEX: 33.32 KG/M2 | HEIGHT: 65 IN | TEMPERATURE: 98 F | RESPIRATION RATE: 16 BRPM | SYSTOLIC BLOOD PRESSURE: 157 MMHG | HEART RATE: 76 BPM | OXYGEN SATURATION: 98 % | WEIGHT: 200 LBS | DIASTOLIC BLOOD PRESSURE: 80 MMHG

## 2023-02-22 PROCEDURE — 2580000003 HC RX 258

## 2023-02-22 RX ORDER — ACETAMINOPHEN 325 MG/1
650 TABLET ORAL EVERY 6 HOURS PRN
COMMUNITY

## 2023-02-22 RX ORDER — SODIUM CHLORIDE 9 MG/ML
INJECTION, SOLUTION INTRAVENOUS
Status: COMPLETED
Start: 2023-02-22 | End: 2023-02-22

## 2023-02-22 RX ORDER — SODIUM CHLORIDE 9 MG/ML
INJECTION, SOLUTION INTRAVENOUS CONTINUOUS
Status: DISCONTINUED | OUTPATIENT
Start: 2023-02-22 | End: 2023-02-22 | Stop reason: HOSPADM

## 2023-02-22 RX ADMIN — SODIUM CHLORIDE: 9 INJECTION, SOLUTION INTRAVENOUS at 10:28

## 2023-02-22 ASSESSMENT — PAIN DESCRIPTION - DESCRIPTORS: DESCRIPTORS: ACHING

## 2023-02-22 ASSESSMENT — LIFESTYLE VARIABLES: SMOKING_STATUS: 1

## 2023-02-22 ASSESSMENT — PAIN - FUNCTIONAL ASSESSMENT: PAIN_FUNCTIONAL_ASSESSMENT: 0-10

## 2023-02-22 NOTE — ANESTHESIA PRE PROCEDURE
Department of Anesthesiology  Preprocedure Note       Name:  Eryn Bradshaw   Age:  41 y.o.  :  1975                                          MRN:  [id number]         Date:  2023      Surgeon: Tonie Swanson):  Mirian Shin MD    Procedure: Procedure(s):  COLORECTAL CANCER SCREENING, NOT HIGH RISK    Medications prior to admission:   Prior to Admission medications    Medication Sig Start Date End Date Taking? Authorizing Provider   cyclobenzaprine (FLEXERIL) 5 MG tablet TAKE 1 TABLET BY MOUTH EVERY 8 HOURS AS NEEDED FOR MUSCLE SPASM. DO NOT TAKE THIS IF BLOOD PRESSURE IS. LESS THAN 110 23   Historical Provider, MD   famotidine (PEPCID) 20 MG tablet Take 20 mg by mouth 2 times daily as needed 1/10/23   Historical Provider, MD   orphenadrine (NORFLEX) 100 MG extended release tablet  23   Historical Provider, MD   oxyCODONE (ROXICODONE) 5 MG immediate release tablet TAKE 1 TABLET BY MOUTH EVERY 4 HOURS FOR UP TO 7 DAYS AS NEEDED 1/10/23   Historical Provider, MD   STIMULANT LAXATIVE 8.6-50 MG per tablet TAKE 1 TABLET BY MOUTH TWICE DAILY 1/10/23   Historical Provider, MD   escitalopram (LEXAPRO) 20 MG tablet Take 1 tablet by mouth daily 23   Sonya Nam MD   predniSONE (DELTASONE) 2.5 MG tablet Take 2.5 mg by mouth daily    Historical Provider, MD   oxyCODONE-acetaminophen (PERCOCET) 5-325 MG per tablet Take 1 tablet by mouth every 4 hours as needed for Pain. Historical Provider, MD   dexamethasone (DECADRON) 2 MG tablet  23   Historical Provider, MD   gabapentin (NEURONTIN) 800 MG tablet Take 1 tablet by mouth 4 times daily for 60 days.  1/5/23 3/6/23  Sonya Nam MD   rosuvastatin (CRESTOR) 40 MG tablet Take 1 tablet by mouth daily 22   Sonya Nam MD   EPINEPHrine [facility]) 0.3 MG/0.3ML SOAJ injection Inject 0.3 mg into the muscle once 20   Historical Provider, MD   Urea (CARMOL) 40 % cream  22   Historical Provider, MD   omeprazole (PRILOSEC) 20 MG delayed release capsule Take 1 capsule by mouth daily 12/5/22   Dennise Kowalski MD       Current medications:    Current Facility-Administered Medications   Medication Dose Route Frequency Provider Last Rate Last Admin    sodium chloride 0.9 % infusion                Allergies: Allergies   Allergen Reactions    Seasonal      Other reaction(s): Other: See Comments  Watering eyes, congestion       Problem List:    Patient Active Problem List   Diagnosis Code    Bilateral carpal tunnel syndrome G56.03       Past Medical History:        Diagnosis Date    Chronic back pain     Chronic headaches     Depression        Past Surgical History:        Procedure Laterality Date    CHOLECYSTECTOMY         Social History:    Social History     Tobacco Use    Smoking status: Every Day     Packs/day: 1.00     Types: Cigarettes    Smokeless tobacco: Never   Substance Use Topics    Alcohol use: Not Currently                                Ready to quit: Not Answered  Counseling given: Not Answered      Vital Signs (Current): There were no vitals filed for this visit.                                            BP Readings from Last 3 Encounters:   02/09/23 116/66   01/12/23 118/80   01/05/23 126/70       NPO Status:                                                                                 BMI:   Wt Readings from Last 3 Encounters:   02/09/23 206 lb 12.8 oz (93.8 kg)   01/12/23 200 lb (90.7 kg)   01/05/23 204 lb 3.2 oz (92.6 kg)     There is no height or weight on file to calculate BMI.    CBC:   Lab Results   Component Value Date/Time    WBC 7.0 02/09/2023 10:18 AM    RBC 3.69 02/09/2023 10:18 AM    HGB 9.3 02/09/2023 10:18 AM    HCT 29.6 02/09/2023 10:18 AM    MCV 80.1 02/09/2023 10:18 AM    RDW 18.9 02/09/2023 10:18 AM     02/09/2023 10:18 AM       CMP:   Lab Results   Component Value Date/Time     12/09/2022 09:35 AM    K 3.6 12/09/2022 09:35 AM    CL 98 12/09/2022 09:35 AM    CO2 26 12/09/2022 09:35 AM    BUN 9 12/09/2022 09:35 AM    CREATININE 0.79 12/09/2022 09:35 AM    GFRAA >60.0 12/21/2020 12:00 AM    LABGLOM >60.0 12/09/2022 09:35 AM    GLUCOSE 99 12/09/2022 09:35 AM    PROT 7.2 12/09/2022 09:35 AM    CALCIUM 9.6 12/09/2022 09:35 AM    BILITOT 0.3 12/09/2022 09:35 AM    ALKPHOS 74 12/09/2022 09:35 AM    AST 13 12/09/2022 09:35 AM    ALT 15 12/09/2022 09:35 AM       POC Tests: No results for input(s): POCGLU, POCNA, POCK, POCCL, POCBUN, POCHEMO, POCHCT in the last 72 hours. Coags: No results found for: PROTIME, INR, APTT    HCG (If Applicable):   Lab Results   Component Value Date    PREGTESTUR negative 12/21/2020        ABGs: No results found for: PHART, PO2ART, LRV6RCS, HYW6ZUZ, BEART, V8EPZBAQ     Type & Screen (If Applicable):  No results found for: LABABO, LABRH    Drug/Infectious Status (If Applicable):  Lab Results   Component Value Date/Time    HEPCAB NONREACTIVE 12/09/2022 09:35 AM       COVID-19 Screening (If Applicable): No results found for: COVID19        Anesthesia Evaluation  Patient summary reviewed and Nursing notes reviewed no history of anesthetic complications:   Airway: Mallampati: II  TM distance: >3 FB   Neck ROM: full  Mouth opening: > = 3 FB   Dental: normal exam         Pulmonary:   (+) current smoker                           Cardiovascular:Negative CV ROS                      Neuro/Psych:   (+) headaches:, depression/anxiety             GI/Hepatic/Renal:   (+) bowel prep, morbid obesity          Endo/Other:                      ROS comment: Chronic back pain Abdominal:             Vascular: Other Findings:           Anesthesia Plan      MAC     ASA 2       Induction: intravenous. Anesthetic plan and risks discussed with patient.                         Bryan Velazco, APRN - CRNA   2/22/2023

## 2023-02-22 NOTE — H&P
Patient Name: Mai Christy  : 1975  MRN: [id number]  DATE: 23      ENDOSCOPY  History and Physical    Procedure:    [] Diagnostic Colonoscopy       [x] Screening Colonoscopy  [] EGD      [] ERCP      [] EUS       [] Other    [x] Previous office notes/History and Physical reviewed from the patients chart. Please see EMR for further details of HPI. I have examined the patient's status immediately prior to the procedure and:      Indications/HPI:    []Abdominal Pain   []Cancer- GI/Lung  []Fhx of colon CA/polyps  []History of Polyps   []Beyers   []Melena  []Abnormal Imaging   []Dysphagia    []Persistent Pneumonia  []Anemia   []Food Impaction  []History of Polyps  []GI Bleed   []Pulmonary nodule/Mass  []Change in bowel habits  []Heartburn/Reflux  []Rectal Bleed (BRBPR)  []Chest Pain - Non Cardiac  []Heme (+) Stool  []Ulcers  []Constipation   []Hemoptysis   []Varices  []Diarrhea   []Hypoxemia  []Nausea/Vomiting   [x]Screening   []Crohns/Colitis  []Other:    Anesthesia:   [x] MAC [] Moderate Sedation   [] General   [] None     ROS: 12 pt Review of Symptoms was negative unless mentioned above    Medications:   Prior to Admission medications    Medication Sig Start Date End Date Taking? Authorizing Provider   cyclobenzaprine (FLEXERIL) 5 MG tablet TAKE 1 TABLET BY MOUTH EVERY 8 HOURS AS NEEDED FOR MUSCLE SPASM. DO NOT TAKE THIS IF BLOOD PRESSURE IS.  LESS THAN 110 23   Historical Provider, MD   famotidine (PEPCID) 20 MG tablet Take 20 mg by mouth 2 times daily as needed 1/10/23   Historical Provider, MD   orphenadrine (NORFLEX) 100 MG extended release tablet  23   Historical Provider, MD   oxyCODONE (ROXICODONE) 5 MG immediate release tablet TAKE 1 TABLET BY MOUTH EVERY 4 HOURS FOR UP TO 7 DAYS AS NEEDED 1/10/23   Historical Provider, MD   STIMULANT LAXATIVE 8.6-50 MG per tablet TAKE 1 TABLET BY MOUTH TWICE DAILY 1/10/23   Historical Provider, MD   escitalopram (LEXAPRO) 20 MG tablet Take 1 tablet by mouth daily 2/9/23   Maykel Sheth MD   predniSONE (DELTASONE) 2.5 MG tablet Take 2.5 mg by mouth daily    Historical Provider, MD   oxyCODONE-acetaminophen (PERCOCET) 5-325 MG per tablet Take 1 tablet by mouth every 4 hours as needed for Pain. Historical Provider, MD   dexamethasone (DECADRON) 2 MG tablet  1/4/23   Historical Provider, MD   gabapentin (NEURONTIN) 800 MG tablet Take 1 tablet by mouth 4 times daily for 60 days. 1/5/23 3/6/23  Maykel Sheth MD   rosuvastatin (CRESTOR) 40 MG tablet Take 1 tablet by mouth daily 12/12/22   Maykel Sheth MD   EPINEPHrine [facility]) 0.3 MG/0.3ML SOAJ injection Inject 0.3 mg into the muscle once 12/24/20   Historical Provider, MD   Urea (CARMOL) 40 % cream  11/8/22   Historical Provider, MD   omeprazole (PRILOSEC) 20 MG delayed release capsule Take 1 capsule by mouth daily 12/5/22   Maykel Sheth MD       Allergies: Allergies   Allergen Reactions    Seasonal      Other reaction(s): Other: See Comments  Watering eyes, congestion        History of allergic reaction to anesthesia:  No    Past Medical History:  Past Medical History:   Diagnosis Date    Chronic back pain     Chronic headaches     Depression        Past Surgical History:  Past Surgical History:   Procedure Laterality Date    CHOLECYSTECTOMY         Social History:  Social History     Tobacco Use    Smoking status: Every Day     Packs/day: 1.00     Types: Cigarettes    Smokeless tobacco: Never   Vaping Use    Vaping Use: Never used   Substance Use Topics    Alcohol use: Not Currently       Vital Signs: There were no vitals filed for this visit. Physical Exam:  Cardiac:  [x]WNL  []Comments:  Pulmonary:  [x]WNL   []Comments:   Neuro/Mental Status:  [x]WNL  []Comments:  Abdominal:  [x]WNL    []Comments:  Other:   []WNL  []Comments:    Informed Consent:  The risks and benefits of the procedure have been discussed with either the patient or if they cannot consent, their representative.     Assessment:  Patient examined and appropriate for planned sedation and procedure. Plan:  Proceed with planned sedation and procedure as above.     Honorio Slater MD  9:56 AM

## 2023-02-23 ENCOUNTER — ANESTHESIA EVENT (OUTPATIENT)
Dept: ENDOSCOPY | Age: 48
End: 2023-02-23
Payer: COMMERCIAL

## 2023-02-23 ENCOUNTER — ANESTHESIA (OUTPATIENT)
Dept: ENDOSCOPY | Age: 48
End: 2023-02-23
Payer: COMMERCIAL

## 2023-02-23 ENCOUNTER — HOSPITAL ENCOUNTER (OUTPATIENT)
Age: 48
Setting detail: OUTPATIENT SURGERY
Discharge: HOME | End: 2023-02-23
Attending: INTERNAL MEDICINE | Admitting: INTERNAL MEDICINE
Payer: COMMERCIAL

## 2023-02-23 VITALS
RESPIRATION RATE: 18 BRPM | OXYGEN SATURATION: 99 % | TEMPERATURE: 98.5 F | SYSTOLIC BLOOD PRESSURE: 123 MMHG | DIASTOLIC BLOOD PRESSURE: 75 MMHG | WEIGHT: 200 LBS | BODY MASS INDEX: 33.32 KG/M2 | HEIGHT: 65 IN | HEART RATE: 70 BPM

## 2023-02-23 DIAGNOSIS — Z12.11 COLON CANCER SCREENING: ICD-10-CM

## 2023-02-23 PROBLEM — K63.5 POLYP OF COLON: Status: ACTIVE | Noted: 2023-02-23

## 2023-02-23 PROCEDURE — 88305 TISSUE EXAM BY PATHOLOGIST: CPT

## 2023-02-23 PROCEDURE — 2709999900 HC NON-CHARGEABLE SUPPLY: Performed by: INTERNAL MEDICINE

## 2023-02-23 PROCEDURE — 3700000000 HC ANESTHESIA ATTENDED CARE: Performed by: INTERNAL MEDICINE

## 2023-02-23 PROCEDURE — 2500000003 HC RX 250 WO HCPCS: Performed by: NURSE ANESTHETIST, CERTIFIED REGISTERED

## 2023-02-23 PROCEDURE — 6360000002 HC RX W HCPCS: Performed by: NURSE ANESTHETIST, CERTIFIED REGISTERED

## 2023-02-23 PROCEDURE — 3609027000 HC COLONOSCOPY: Performed by: INTERNAL MEDICINE

## 2023-02-23 PROCEDURE — 6370000000 HC RX 637 (ALT 250 FOR IP): Performed by: INTERNAL MEDICINE

## 2023-02-23 PROCEDURE — 2580000003 HC RX 258

## 2023-02-23 PROCEDURE — 7100000011 HC PHASE II RECOVERY - ADDTL 15 MIN: Performed by: INTERNAL MEDICINE

## 2023-02-23 PROCEDURE — 3700000001 HC ADD 15 MINUTES (ANESTHESIA): Performed by: INTERNAL MEDICINE

## 2023-02-23 PROCEDURE — 2580000003 HC RX 258: Performed by: INTERNAL MEDICINE

## 2023-02-23 PROCEDURE — 7100000010 HC PHASE II RECOVERY - FIRST 15 MIN: Performed by: INTERNAL MEDICINE

## 2023-02-23 PROCEDURE — 45385 COLONOSCOPY W/LESION REMOVAL: CPT | Performed by: INTERNAL MEDICINE

## 2023-02-23 RX ORDER — SODIUM CHLORIDE 9 MG/ML
INJECTION, SOLUTION INTRAVENOUS PRN
Status: DISCONTINUED | OUTPATIENT
Start: 2023-02-23 | End: 2023-02-23 | Stop reason: HOSPADM

## 2023-02-23 RX ORDER — SODIUM CHLORIDE 9 MG/ML
INJECTION, SOLUTION INTRAVENOUS
Status: COMPLETED
Start: 2023-02-23 | End: 2023-02-23

## 2023-02-23 RX ORDER — MAGNESIUM HYDROXIDE 1200 MG/15ML
LIQUID ORAL PRN
Status: DISCONTINUED | OUTPATIENT
Start: 2023-02-23 | End: 2023-02-23 | Stop reason: ALTCHOICE

## 2023-02-23 RX ORDER — PROPOFOL 10 MG/ML
INJECTION, EMULSION INTRAVENOUS PRN
Status: DISCONTINUED | OUTPATIENT
Start: 2023-02-23 | End: 2023-02-23 | Stop reason: SDUPTHER

## 2023-02-23 RX ORDER — SODIUM CHLORIDE 0.9 % (FLUSH) 0.9 %
5-40 SYRINGE (ML) INJECTION EVERY 12 HOURS SCHEDULED
Status: DISCONTINUED | OUTPATIENT
Start: 2023-02-23 | End: 2023-02-23 | Stop reason: HOSPADM

## 2023-02-23 RX ORDER — LIDOCAINE HYDROCHLORIDE 20 MG/ML
INJECTION, SOLUTION INFILTRATION; PERINEURAL PRN
Status: DISCONTINUED | OUTPATIENT
Start: 2023-02-23 | End: 2023-02-23 | Stop reason: SDUPTHER

## 2023-02-23 RX ORDER — SODIUM CHLORIDE 0.9 % (FLUSH) 0.9 %
5-40 SYRINGE (ML) INJECTION PRN
Status: DISCONTINUED | OUTPATIENT
Start: 2023-02-23 | End: 2023-02-23 | Stop reason: HOSPADM

## 2023-02-23 RX ORDER — SODIUM CHLORIDE 9 MG/ML
INJECTION, SOLUTION INTRAVENOUS CONTINUOUS
Status: DISCONTINUED | OUTPATIENT
Start: 2023-02-23 | End: 2023-02-23 | Stop reason: HOSPADM

## 2023-02-23 RX ORDER — SIMETHICONE 20 MG/.3ML
EMULSION ORAL PRN
Status: DISCONTINUED | OUTPATIENT
Start: 2023-02-23 | End: 2023-02-23 | Stop reason: ALTCHOICE

## 2023-02-23 RX ADMIN — SODIUM CHLORIDE: 9 INJECTION, SOLUTION INTRAVENOUS at 08:28

## 2023-02-23 RX ADMIN — SODIUM CHLORIDE: 9 INJECTION, SOLUTION INTRAVENOUS at 09:19

## 2023-02-23 RX ADMIN — PROPOFOL 400 MG: 10 INJECTION, EMULSION INTRAVENOUS at 09:05

## 2023-02-23 RX ADMIN — LIDOCAINE HYDROCHLORIDE 60 MG: 20 INJECTION, SOLUTION INFILTRATION; PERINEURAL at 09:05

## 2023-02-23 ASSESSMENT — LIFESTYLE VARIABLES: SMOKING_STATUS: 1

## 2023-02-23 ASSESSMENT — PAIN - FUNCTIONAL ASSESSMENT: PAIN_FUNCTIONAL_ASSESSMENT: 0-10

## 2023-02-23 NOTE — ANESTHESIA POSTPROCEDURE EVALUATION
Department of Anesthesiology  Postprocedure Note    Patient: Manuel Borja  MRN: [id number]  YOB: 1975  Date of evaluation: 2/23/2023      Procedure Summary     Date: 02/23/23 Room / Location: [facility] OR 01 / [facility]    Anesthesia Start: 9458 Anesthesia Stop:     Procedure: COLORECTAL CANCER SCREENING, NOT HIGH RISK Diagnosis:       Colon cancer screening      (Colon cancer screening [Z12.11])    Surgeons: Alec Goel MD Responsible Provider: CAROLINE Reyes CRNA    Anesthesia Type: MAC ASA Status: 2          Anesthesia Type: No value filed.     Ofelia Phase I: Ofelia Score: 10    Ofelia Phase II:        Anesthesia Post Evaluation    Patient location during evaluation: bedside  Patient participation: complete - patient participated  Level of consciousness: awake and awake and alert  Pain score: 0  Airway patency: patent  Nausea & Vomiting: no nausea and no vomiting  Complications: no  Cardiovascular status: blood pressure returned to baseline and hemodynamically stable  Respiratory status: acceptable and spontaneous ventilation  Hydration status: euvolemic

## 2023-02-23 NOTE — ANESTHESIA PRE PROCEDURE
Department of Anesthesiology  Preprocedure Note       Name:  Nneka Babcock   Age:  41 y.o.  :  1975                                          MRN:  [id number]         Date:  2023      Surgeon: Tammy Caldera):  Bebeto Cam MD    Procedure: Procedure(s):  COLORECTAL CANCER SCREENING, NOT HIGH RISK    Medications prior to admission:   Prior to Admission medications    Medication Sig Start Date End Date Taking? Authorizing Provider   NAPROXEN PO Take by mouth    Historical Provider, MD   Rimegepant Sulfate (NURTEC PO) Take by mouth    Historical Provider, MD   acetaminophen (TYLENOL) 325 MG tablet Take 650 mg by mouth every 6 hours as needed for Pain    Historical Provider, MD   cyclobenzaprine (FLEXERIL) 5 MG tablet TAKE 1 TABLET BY MOUTH EVERY 8 HOURS AS NEEDED FOR MUSCLE SPASM. DO NOT TAKE THIS IF BLOOD PRESSURE IS. LESS THAN 110  Patient not taking: Reported on 2023   Historical Provider, MD   famotidine (PEPCID) 20 MG tablet Take 20 mg by mouth 2 times daily as needed 1/10/23   Historical Provider, MD   orphenadrine (NORFLEX) 100 MG extended release tablet  23   Historical Provider, MD   oxyCODONE (ROXICODONE) 5 MG immediate release tablet TAKE 1 TABLET BY MOUTH EVERY 4 HOURS FOR UP TO 7 DAYS AS NEEDED  Patient not taking: Reported on 2023 1/10/23   Historical Provider, MD   STIMULANT LAXATIVE 8.6-50 MG per tablet TAKE 1 TABLET BY MOUTH TWICE DAILY 1/10/23   Historical Provider, MD   escitalopram (LEXAPRO) 20 MG tablet Take 1 tablet by mouth daily 23   Lucas Fried MD   predniSONE (DELTASONE) 2.5 MG tablet Take 2.5 mg by mouth daily  Patient not taking: Reported on 2023    Historical Provider, MD   oxyCODONE-acetaminophen (PERCOCET) 5-325 MG per tablet Take 1 tablet by mouth every 4 hours as needed for Pain.   Patient not taking: Reported on 2023    Historical Provider, MD   dexamethasone (DECADRON) 2 MG tablet  23   Historical Provider, MD   gabapentin (NEURONTIN) 800 MG tablet Take 1 tablet by mouth 4 times daily for 60 days. 1/5/23 3/6/23  Kimber Moreau MD   rosuvastatin (CRESTOR) 40 MG tablet Take 1 tablet by mouth daily 12/12/22   Kimber Moreau MD   EPINEPHrine [facility]) 0.3 MG/0.3ML SOAJ injection Inject 0.3 mg into the muscle once 12/24/20   Historical Provider, MD   Urea (CARMOL) 40 % cream  11/8/22   Historical Provider, MD   omeprazole (PRILOSEC) 20 MG delayed release capsule Take 1 capsule by mouth daily 12/5/22   Kimber Moreau MD       Current medications:    No current facility-administered medications for this encounter. Current Outpatient Medications   Medication Sig Dispense Refill    NAPROXEN PO Take by mouth      Rimegepant Sulfate (NURTEC PO) Take by mouth      acetaminophen (TYLENOL) 325 MG tablet Take 650 mg by mouth every 6 hours as needed for Pain      cyclobenzaprine (FLEXERIL) 5 MG tablet TAKE 1 TABLET BY MOUTH EVERY 8 HOURS AS NEEDED FOR MUSCLE SPASM. DO NOT TAKE THIS IF BLOOD PRESSURE IS. LESS THAN 110 (Patient not taking: Reported on 2/22/2023)      famotidine (PEPCID) 20 MG tablet Take 20 mg by mouth 2 times daily as needed      orphenadrine (NORFLEX) 100 MG extended release tablet       oxyCODONE (ROXICODONE) 5 MG immediate release tablet TAKE 1 TABLET BY MOUTH EVERY 4 HOURS FOR UP TO 7 DAYS AS NEEDED (Patient not taking: Reported on 2/22/2023)      STIMULANT LAXATIVE 8.6-50 MG per tablet TAKE 1 TABLET BY MOUTH TWICE DAILY      escitalopram (LEXAPRO) 20 MG tablet Take 1 tablet by mouth daily 60 tablet 3    predniSONE (DELTASONE) 2.5 MG tablet Take 2.5 mg by mouth daily (Patient not taking: Reported on 2/22/2023)      oxyCODONE-acetaminophen (PERCOCET) 5-325 MG per tablet Take 1 tablet by mouth every 4 hours as needed for Pain. (Patient not taking: Reported on 2/22/2023)      dexamethasone (DECADRON) 2 MG tablet       gabapentin (NEURONTIN) 800 MG tablet Take 1 tablet by mouth 4 times daily for 60 days.  240 tablet 3    rosuvastatin (CRESTOR) 40 MG tablet Take 1 tablet by mouth daily 90 tablet 1    EPINEPHrine (EPIPEN) 0.3 MG/0.3ML SOAJ injection Inject 0.3 mg into the muscle once      Urea (CARMOL) 40 % cream       omeprazole (PRILOSEC) 20 MG delayed release capsule Take 1 capsule by mouth daily 60 capsule 3       Allergies: Allergies   Allergen Reactions    Seasonal      Other reaction(s): Other: See Comments  Watering eyes, congestion       Problem List:    Patient Active Problem List   Diagnosis Code    Bilateral carpal tunnel syndrome G56.03       Past Medical History:        Diagnosis Date    Chronic back pain     Chronic headaches     Depression        Past Surgical History:        Procedure Laterality Date    CHOLECYSTECTOMY      LUMBAR DISCECTOMY  01/23/2023    SEPTOPLASTY      VULVA SURGERY      abcess removal       Social History:    Social History     Tobacco Use    Smoking status: Every Day     Packs/day: 0.25     Types: Cigarettes    Smokeless tobacco: Never   Substance Use Topics    Alcohol use: Not Currently     Comment: socially                                Ready to quit: Not Answered  Counseling given: Not Answered      Vital Signs (Current): There were no vitals filed for this visit.                                            BP Readings from Last 3 Encounters:   02/22/23 (!) 157/80   02/09/23 116/66   01/12/23 118/80       NPO Status:                                                                                 BMI:   Wt Readings from Last 3 Encounters:   02/22/23 200 lb (90.7 kg)   02/09/23 206 lb 12.8 oz (93.8 kg)   01/12/23 200 lb (90.7 kg)     There is no height or weight on file to calculate BMI.    CBC:   Lab Results   Component Value Date/Time    WBC 7.0 02/09/2023 10:18 AM    RBC 3.69 02/09/2023 10:18 AM    HGB 9.3 02/09/2023 10:18 AM    HCT 29.6 02/09/2023 10:18 AM    MCV 80.1 02/09/2023 10:18 AM    RDW 18.9 02/09/2023 10:18 AM     02/09/2023 10:18 AM       CMP:   Lab Results Component Value Date/Time     12/09/2022 09:35 AM    K 3.6 12/09/2022 09:35 AM    CL 98 12/09/2022 09:35 AM    CO2 26 12/09/2022 09:35 AM    BUN 9 12/09/2022 09:35 AM    CREATININE 0.79 12/09/2022 09:35 AM    GFRAA >60.0 12/21/2020 12:00 AM    LABGLOM >60.0 12/09/2022 09:35 AM    GLUCOSE 99 12/09/2022 09:35 AM    PROT 7.2 12/09/2022 09:35 AM    CALCIUM 9.6 12/09/2022 09:35 AM    BILITOT 0.3 12/09/2022 09:35 AM    ALKPHOS 74 12/09/2022 09:35 AM    AST 13 12/09/2022 09:35 AM    ALT 15 12/09/2022 09:35 AM       POC Tests: No results for input(s): POCGLU, POCNA, POCK, POCCL, POCBUN, POCHEMO, POCHCT in the last 72 hours. Coags: No results found for: PROTIME, INR, APTT    HCG (If Applicable):   Lab Results   Component Value Date    PREGTESTUR negative 12/21/2020        ABGs: No results found for: PHART, PO2ART, ZLO5YEU, YRC7GVT, BEART, T5OLPVHQ     Type & Screen (If Applicable):  No results found for: LABABO, LABRH    Drug/Infectious Status (If Applicable):  Lab Results   Component Value Date/Time    HEPCAB NONREACTIVE 12/09/2022 09:35 AM       COVID-19 Screening (If Applicable): No results found for: COVID19        Anesthesia Evaluation  Patient summary reviewed and Nursing notes reviewed  Airway: Mallampati: II  TM distance: >3 FB   Neck ROM: full  Mouth opening: > = 3 FB   Dental:          Pulmonary:normal exam  breath sounds clear to auscultation  (+) current smoker                           Cardiovascular:Negative CV ROS            Rhythm: regular  Rate: normal                    Neuro/Psych:   (+) headaches:, psychiatric history:            GI/Hepatic/Renal:   (+) bowel prep,           Endo/Other: Negative Endo/Other ROS                    Abdominal:             Vascular: negative vascular ROS. Other Findings:           Anesthesia Plan      MAC     ASA 2       Induction: intravenous. Anesthetic plan and risks discussed with patient.       Plan discussed with attending.                     Grace Hester, APRN - CRNA   2/23/2023

## 2023-02-23 NOTE — H&P
Patient Name: Mai Christy  : 1975  MRN: [id number]  DATE: 23      ENDOSCOPY  History and Physical    Procedure:    [] Diagnostic Colonoscopy       [x] Screening Colonoscopy  [] EGD      [] ERCP      [] EUS       [] Other    [x] Previous office notes/History and Physical reviewed from the patients chart. Please see EMR for further details of HPI. I have examined the patient's status immediately prior to the procedure and:      Indications/HPI:    []Abdominal Pain   []Cancer- GI/Lung  []Fhx of colon CA/polyps  []History of Polyps   []Beyers   []Melena  []Abnormal Imaging   []Dysphagia    []Persistent Pneumonia  []Anemia   []Food Impaction  []History of Polyps  []GI Bleed   []Pulmonary nodule/Mass  []Change in bowel habits  []Heartburn/Reflux  []Rectal Bleed (BRBPR)  []Chest Pain - Non Cardiac  []Heme (+) Stool  []Ulcers  []Constipation   []Hemoptysis   []Varices  []Diarrhea   []Hypoxemia  []Nausea/Vomiting   [x]Screening   []Crohns/Colitis  []Other:    Anesthesia:   [x] MAC [] Moderate Sedation   [] General   [] None     ROS: 12 pt Review of Symptoms was negative unless mentioned above    Medications:   Prior to Admission medications    Medication Sig Start Date End Date Taking? Authorizing Provider   NAPROXEN PO Take by mouth    Historical Provider, MD   Rimegepant Sulfate (NURTEC PO) Take by mouth    Historical Provider, MD   acetaminophen (TYLENOL) 325 MG tablet Take 650 mg by mouth every 6 hours as needed for Pain    Historical Provider, MD   cyclobenzaprine (FLEXERIL) 5 MG tablet TAKE 1 TABLET BY MOUTH EVERY 8 HOURS AS NEEDED FOR MUSCLE SPASM. DO NOT TAKE THIS IF BLOOD PRESSURE IS.  LESS THAN 110  Patient not taking: No sig reported 23   Historical Provider, MD   famotidine (PEPCID) 20 MG tablet Take 20 mg by mouth 2 times daily as needed  Patient not taking: Reported on 2023 1/10/23   Historical Provider, MD   orphenadrine (NORFLEX) 100 MG extended release tablet  23 Historical Provider, MD   oxyCODONE (ROXICODONE) 5 MG immediate release tablet TAKE 1 TABLET BY MOUTH EVERY 4 HOURS FOR UP TO 7 DAYS AS NEEDED  Patient not taking: No sig reported 1/10/23   Historical Provider, MD   STIMULANT LAXATIVE 8.6-50 MG per tablet TAKE 1 TABLET BY MOUTH TWICE DAILY 1/10/23   Historical Provider, MD   escitalopram (LEXAPRO) 20 MG tablet Take 1 tablet by mouth daily 2/9/23   Varsha Reeves MD   predniSONE (DELTASONE) 2.5 MG tablet Take 2.5 mg by mouth daily  Patient not taking: No sig reported    Historical Provider, MD   oxyCODONE-acetaminophen (PERCOCET) 5-325 MG per tablet Take 1 tablet by mouth every 4 hours as needed for Pain. Patient not taking: No sig reported    Historical Provider, MD   dexamethasone (DECADRON) 2 MG tablet  1/4/23   Historical Provider, MD   gabapentin (NEURONTIN) 800 MG tablet Take 1 tablet by mouth 4 times daily for 60 days. 1/5/23 3/6/23  Varsha Reeves MD   rosuvastatin (CRESTOR) 40 MG tablet Take 1 tablet by mouth daily 12/12/22   Varsha Reeves MD   EPINEPHrine [facility]) 0.3 MG/0.3ML SOAJ injection Inject 0.3 mg into the muscle once 12/24/20   Historical Provider, MD   Urea (CARMOL) 40 % cream  11/8/22   Historical Provider, MD   omeprazole (PRILOSEC) 20 MG delayed release capsule Take 1 capsule by mouth daily 12/5/22   Varsha Reeves MD       Allergies: Allergies   Allergen Reactions    Seasonal      Other reaction(s):  Other: See Comments  Watering eyes, congestion        History of allergic reaction to anesthesia:  No    Past Medical History:  Past Medical History:   Diagnosis Date    Chronic back pain     Chronic headaches     Depression        Past Surgical History:  Past Surgical History:   Procedure Laterality Date    CHOLECYSTECTOMY      LUMBAR DISCECTOMY  01/23/2023    SEPTOPLASTY      VULVA SURGERY      abcess removal       Social History:  Social History     Tobacco Use    Smoking status: Every Day     Packs/day: 0.10     Types: Cigarettes    Smokeless tobacco: Never    Tobacco comments:     1 cigarette per day   Vaping Use    Vaping Use: Every day    Substances: Nicotine, menthol    Devices: Disposable   Substance Use Topics    Alcohol use: Not Currently     Comment: socially    Drug use: Never       Vital Signs: There were no vitals filed for this visit. Physical Exam:  Cardiac:  [x]WNL  []Comments:  Pulmonary:  [x]WNL   []Comments:   Neuro/Mental Status:  [x]WNL  []Comments:  Abdominal:  [x]WNL    []Comments:  Other:   []WNL  []Comments:    Informed Consent:  The risks and benefits of the procedure have been discussed with either the patient or if they cannot consent, their representative. Assessment:  Patient examined and appropriate for planned sedation and procedure. Plan:  Proceed with planned sedation and procedure as above.     Jose Natarajan MD  8:26 AM

## 2023-02-27 ENCOUNTER — OFFICE VISIT (OUTPATIENT)
Dept: PRIMARY CARE CLINIC | Age: 48
End: 2023-02-27
Payer: COMMERCIAL

## 2023-02-27 VITALS
BODY MASS INDEX: 35.65 KG/M2 | HEART RATE: 75 BPM | DIASTOLIC BLOOD PRESSURE: 80 MMHG | SYSTOLIC BLOOD PRESSURE: 130 MMHG | RESPIRATION RATE: 18 BRPM | OXYGEN SATURATION: 100 % | HEIGHT: 65 IN | WEIGHT: 214 LBS | TEMPERATURE: 97.6 F

## 2023-02-27 DIAGNOSIS — G47.00 INSOMNIA, UNSPECIFIED TYPE: ICD-10-CM

## 2023-02-27 DIAGNOSIS — F43.23 ADJUSTMENT DISORDER WITH MIXED ANXIETY AND DEPRESSED MOOD: Primary | ICD-10-CM

## 2023-02-27 DIAGNOSIS — L85.9 HYPERKERATOSIS: ICD-10-CM

## 2023-02-27 PROCEDURE — G8427 DOCREV CUR MEDS BY ELIG CLIN: HCPCS | Performed by: INTERNAL MEDICINE

## 2023-02-27 PROCEDURE — 99214 OFFICE O/P EST MOD 30 MIN: CPT | Performed by: INTERNAL MEDICINE

## 2023-02-27 PROCEDURE — 4004F PT TOBACCO SCREEN RCVD TLK: CPT | Performed by: INTERNAL MEDICINE

## 2023-02-27 PROCEDURE — G8484 FLU IMMUNIZE NO ADMIN: HCPCS | Performed by: INTERNAL MEDICINE

## 2023-02-27 PROCEDURE — G8417 CALC BMI ABV UP PARAM F/U: HCPCS | Performed by: INTERNAL MEDICINE

## 2023-02-27 RX ORDER — TRAZODONE HYDROCHLORIDE 50 MG/1
100 TABLET ORAL NIGHTLY PRN
Qty: 60 TABLET | Refills: 3 | Status: SHIPPED | OUTPATIENT
Start: 2023-02-27

## 2023-02-27 ASSESSMENT — ENCOUNTER SYMPTOMS
ABDOMINAL PAIN: 0
COUGH: 0
DIARRHEA: 0
VOMITING: 0
WHEEZING: 0
SHORTNESS OF BREATH: 0
NAUSEA: 0

## 2023-02-27 NOTE — PROGRESS NOTES
Subjective:      Patient ID: Minnie Ambrocio is a 41 y.o. female    ANxiety f/u  HPI  Pt presents for follow up regarding management of anxiety and depression. Pt on Lexapro 20mg (since 2 weeks). Not well controlled. . Attests to poor sleep, energy and appetite. No suicidal ideations or hallucinations. Following with psychologist. Wants to retry trazodone 100mg (claims inadequate sleep on trazodone). Past Medical History:   Diagnosis Date    Chronic back pain     Chronic headaches     Depression      Past Surgical History:   Procedure Laterality Date    CHOLECYSTECTOMY      COLONOSCOPY N/A 2/23/2023    COLORECTAL CANCER SCREENING, NOT HIGH RISK performed by Isidro Santos MD at [facility] DISCECTOMY  01/23/2023    SEPTOPLASTY      VULVA SURGERY      abcess removal     Social History     Socioeconomic History    Marital status: Single     Spouse name: Not on file    Number of children: Not on file    Years of education: Not on file    Highest education level: Not on file   Occupational History    Not on file   Tobacco Use    Smoking status: Every Day     Packs/day: 0.10     Types: Cigarettes    Smokeless tobacco: Never    Tobacco comments:     1 cigarette per day   Vaping Use    Vaping Use: Every day    Substances: Nicotine, menthol    Devices: Disposable   Substance and Sexual Activity    Alcohol use: Not Currently     Comment: socially    Drug use: Never    Sexual activity: Not on file   Other Topics Concern    Not on file   Social History Narrative    Not on file     Social Determinants of Health     Financial Resource Strain: Low Risk     Difficulty of Paying Living Expenses: Not hard at all   Food Insecurity: Food Insecurity Present    Worried About [address] [employer] in the Last Year: Sometimes true    Ran Out of Food in the Last Year: Never true   Transportation Needs: No Transportation Needs    Lack of Transportation (Medical): No    Lack of Transportation (Non-Medical):  No Physical Activity: Not on file   Stress: Not on file   Social Connections: Not on file   Intimate Partner Violence: Not on file   Housing Stability: Unknown    Unable to Pay for Housing in the Last Year: Not on file    Number of Places Lived in the Last Year: Not on file    Unstable Housing in the Last Year: No     Family History   Problem Relation Age of Onset    Arthritis Mother     Colon Cancer Neg Hx      Allergies:  Seasonal  Patient Active Problem List   Diagnosis    Bilateral carpal tunnel syndrome    Colon cancer screening    Polyp of colon     Current Outpatient Medications on File Prior to Visit   Medication Sig Dispense Refill    NAPROXEN PO Take by mouth      acetaminophen (TYLENOL) 325 MG tablet Take 650 mg by mouth every 6 hours as needed for Pain      orphenadrine (NORFLEX) 100 MG extended release tablet       STIMULANT LAXATIVE 8.6-50 MG per tablet TAKE 1 TABLET BY MOUTH TWICE DAILY      escitalopram (LEXAPRO) 20 MG tablet Take 1 tablet by mouth daily 60 tablet 3    gabapentin (NEURONTIN) 800 MG tablet Take 1 tablet by mouth 4 times daily for 60 days. 240 tablet 3    rosuvastatin (CRESTOR) 40 MG tablet Take 1 tablet by mouth daily 90 tablet 1    EPINEPHrine (EPIPEN) 0.3 MG/0.3ML SOAJ injection Inject 0.3 mg into the muscle once      omeprazole (PRILOSEC) 20 MG delayed release capsule Take 1 capsule by mouth daily 60 capsule 3    Rimegepant Sulfate (NURTEC PO) Take by mouth      cyclobenzaprine (FLEXERIL) 5 MG tablet TAKE 1 TABLET BY MOUTH EVERY 8 HOURS AS NEEDED FOR MUSCLE SPASM. DO NOT TAKE THIS IF BLOOD PRESSURE IS.  LESS THAN 110      famotidine (PEPCID) 20 MG tablet Take 20 mg by mouth 2 times daily as needed      oxyCODONE (ROXICODONE) 5 MG immediate release tablet TAKE 1 TABLET BY MOUTH EVERY 4 HOURS FOR UP TO 7 DAYS AS NEEDED      predniSONE (DELTASONE) 2.5 MG tablet Take 2.5 mg by mouth daily      oxyCODONE-acetaminophen (PERCOCET) 5-325 MG per tablet Take 1 tablet by mouth every 4 hours as needed for Pain. dexamethasone (DECADRON) 2 MG tablet        No current facility-administered medications on file prior to visit. Review of Systems   Constitutional:  Negative for chills, diaphoresis, fatigue and fever. HENT:  Negative for congestion, ear discharge and ear pain. Respiratory:  Negative for cough, shortness of breath and wheezing. Cardiovascular:  Negative for chest pain. Gastrointestinal:  Negative for abdominal pain, diarrhea, nausea and vomiting. Endocrine: Negative for cold intolerance and heat intolerance. Genitourinary:  Negative for dysuria and frequency. Neurological:  Negative for dizziness and light-headedness. Psychiatric/Behavioral:  Positive for dysphoric mood and sleep disturbance. Negative for suicidal ideas. The patient is nervous/anxious. Objective:   /80   Pulse 75   Temp 97.6 °F (36.4 °C)   Resp 18   Ht 5' 5\" (1.651 m)   Wt 214 lb (97.1 kg)   SpO2 100%   BMI 35.61 kg/m²     Physical Exam  Constitutional:       General: She is not in acute distress. Appearance: She is not diaphoretic. Cardiovascular:      Rate and Rhythm: Normal rate and regular rhythm. Pulses: Normal pulses. Heart sounds: Normal heart sounds, S1 normal and S2 normal.   Pulmonary:      Effort: Pulmonary effort is normal. No respiratory distress. Breath sounds: Normal breath sounds. No wheezing or rales. Chest:      Chest wall: No tenderness. Abdominal:      General: Bowel sounds are normal.      Tenderness: There is no abdominal tenderness. Neurological:      General: No focal deficit present. Mental Status: She is alert. Cranial Nerves: No cranial nerve deficit. Psychiatric:         Mood and Affect: Mood normal.         Thought Content: Thought content normal.     Assessment:       Diagnosis Orders   1. Adjustment disorder with mixed anxiety and depressed mood Inadequately Controlled     will use med for longer       2.  Insomnia, unspecified type Inadequately Controlled traZODone (DESYREL) 50 MG tablet    will increase trazoodone dose       3. Hyperkeratosis  urea (CARMOL) 10 % cream        Plan:      No orders of the defined types were placed in this encounter. Orders Placed This Encounter   Medications    traZODone (DESYREL) 50 MG tablet     Sig: Take 2 tablets by mouth nightly as needed for Sleep     Dispense:  60 tablet     Refill:  3     Dc doxylamine    urea (CARMOL) 10 % cream     Sig: Apply topically as needed. Dispense:  85 g     Refill:  4     Return in about 4 weeks (around 3/27/2023) for follow up, with PCP.

## 2023-03-03 ENCOUNTER — OFFICE VISIT (OUTPATIENT)
Dept: BEHAVIORAL/MENTAL HEALTH CLINIC | Age: 48
End: 2023-03-03
Payer: COMMERCIAL

## 2023-03-03 DIAGNOSIS — F32.A DEPRESSION, UNSPECIFIED DEPRESSION TYPE: ICD-10-CM

## 2023-03-03 DIAGNOSIS — F43.10 PTSD (POST-TRAUMATIC STRESS DISORDER): Primary | ICD-10-CM

## 2023-03-03 DIAGNOSIS — F17.200 NICOTINE USE DISORDER: ICD-10-CM

## 2023-03-03 DIAGNOSIS — F41.9 ANXIETY DISORDER, UNSPECIFIED TYPE: ICD-10-CM

## 2023-03-03 PROCEDURE — 4004F PT TOBACCO SCREEN RCVD TLK: CPT | Performed by: PSYCHOLOGIST

## 2023-03-03 PROCEDURE — 90832 PSYTX W PT 30 MINUTES: CPT | Performed by: PSYCHOLOGIST

## 2023-03-03 ASSESSMENT — ANXIETY QUESTIONNAIRES
4. TROUBLE RELAXING: 2
3. WORRYING TOO MUCH ABOUT DIFFERENT THINGS: 1
IF YOU CHECKED OFF ANY PROBLEMS ON THIS QUESTIONNAIRE, HOW DIFFICULT HAVE THESE PROBLEMS MADE IT FOR YOU TO DO YOUR WORK, TAKE CARE OF THINGS AT HOME, OR GET ALONG WITH OTHER PEOPLE: SOMEWHAT DIFFICULT
1. FEELING NERVOUS, ANXIOUS, OR ON EDGE: 3
2. NOT BEING ABLE TO STOP OR CONTROL WORRYING: 2
GAD7 TOTAL SCORE: 13
7. FEELING AFRAID AS IF SOMETHING AWFUL MIGHT HAPPEN: 2
5. BEING SO RESTLESS THAT IT IS HARD TO SIT STILL: 2
6. BECOMING EASILY ANNOYED OR IRRITABLE: 1

## 2023-03-03 ASSESSMENT — PATIENT HEALTH QUESTIONNAIRE - PHQ9
8. MOVING OR SPEAKING SO SLOWLY THAT OTHER PEOPLE COULD HAVE NOTICED. OR THE OPPOSITE, BEING SO FIGETY OR RESTLESS THAT YOU HAVE BEEN MOVING AROUND A LOT MORE THAN USUAL: 2
6. FEELING BAD ABOUT YOURSELF - OR THAT YOU ARE A FAILURE OR HAVE LET YOURSELF OR YOUR FAMILY DOWN: 0
7. TROUBLE CONCENTRATING ON THINGS, SUCH AS READING THE NEWSPAPER OR WATCHING TELEVISION: 3
10. IF YOU CHECKED OFF ANY PROBLEMS, HOW DIFFICULT HAVE THESE PROBLEMS MADE IT FOR YOU TO DO YOUR WORK, TAKE CARE OF THINGS AT HOME, OR GET ALONG WITH OTHER PEOPLE: 1
1. LITTLE INTEREST OR PLEASURE IN DOING THINGS: 3
SUM OF ALL RESPONSES TO PHQ QUESTIONS 1-9: 17
2. FEELING DOWN, DEPRESSED OR HOPELESS: 3
9. THOUGHTS THAT YOU WOULD BE BETTER OFF DEAD, OR OF HURTING YOURSELF: 0
4. FEELING TIRED OR HAVING LITTLE ENERGY: 3
SUM OF ALL RESPONSES TO PHQ QUESTIONS 1-9: 17
3. TROUBLE FALLING OR STAYING ASLEEP: 3
SUM OF ALL RESPONSES TO PHQ QUESTIONS 1-9: 17
SUM OF ALL RESPONSES TO PHQ9 QUESTIONS 1 & 2: 6
5. POOR APPETITE OR OVEREATING: 0
SUM OF ALL RESPONSES TO PHQ QUESTIONS 1-9: 17

## 2023-03-03 NOTE — RESULT ENCOUNTER NOTE
3/3/2023  [address] [place] [address]    Dear Ludmila Villareal,    Your colonoscopy  reveled a growth on the large intestine (Colon) called a polyp. A polyp could be a \"precancerous\" growth, which means that with time it could turn into cancer. Polyps were removed during your procedure. As instructed after your colonoscopy, recommendations suggest a follow up colonoscopy in 2 to 3 years    We hope you are doing well, and if you have any questions please contact me at [phone]. Thank you for choosing [facility] for your healthcare.     Sincerely,     Quan Cornejo MD
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
## 2023-03-03 NOTE — PATIENT INSTRUCTIONS
\"The entire autonomic nervous system (and through it, our internal organs and glands) is largely driven by our breathing patterns. By changing our breathing we can influence millions of biochemical reactions in our body, producing more relaxing substances such as endorphins and fewer anxiety-producing ones like adrenaline and higher blood acidity. Mindfulness of the breath is so effective that it is common to all meditative and prayer traditions. \" Anxiety Fear & Breathing - Breathing. com    \"When overcoming high levels of anxiety, it is important to learn the techniques of correct breathing. Many people who live with high levels of anxiety are known to breathe through their chest. Shallow breathing through the chest means you are disrupting the balance of oxygen and carbon dioxide necessary to be in a relaxed state. This type of breathing will perpetuate the symptoms of anxiety. \" Netshow.me. Cuciniale        CONTROLLED or MEASURED BREATHING EXERCISE: (DOWNLOAD THE FREE [employer] \"VIRTUAL HOPE BOX\" TO PRACTICE THIS EXERCISE)    *The point of this is to exhale DOUBLE the amount of time you are inhaling. Below is an example of what you can practice if relaxed. Change the time you inhale when anxious to whatever you can naturally inhale for then double that time for your exhale breath. You can sit or stand, but be sure to soften up a little before you begin. Make sure your hands are relaxed, and your knees are soft. Drop your shoulders and let your jaw relax. Now breath in slowly through your nose and count to *three*, keep your shoulders down and allow your stomach to expand as you breathe in. Hold the breath for a moment. Now release your breath slowly and smoothly as you count to *six*. Repeat for a couple of minutes       RELAXED BREATHING    *Dedicate 5 minutes 3x/day to practice the deep breathing. Try to focus on making your breath out roughly twice as long as your breath in.  Imagine there is a balloon just behind your navel: imagine you are inflating the balloon as you breathe in, deflating as you breathe out. Grounding Techniques     After a trauma, it's normal to experience flashbacks, anxiety, and other uncomfortable symptoms. Grounding techniques help control these symptoms by turning attention away from thoughts, memories, or worries, and refocusing on the present moment. 5-4-3-2-1 Technique   Using the 5-4-3-2-1 technique, you will purposefully take in the details of your surroundings using each of your senses. Strive to notice small details that your mind would usually tune out, such as distant sounds, or the texture of an ordinary object. What are 5 things you can see? Look for small details such as a pattern on the ceiling, the way light reflects off a surface, or an object you never noticed. What are 4 things you can feel? Notice the sensation of clothing on your body, the sun on your skin, or the feeling of the chair you are sitting in.  an object and examine its weight, texture, and other physical qualities. What are 3 things you can hear? Pay special attention to the sounds your mind has tuned out, such as a ticking clock, distant traffic, or trees blowing in the wind. What are 2 things you can smell? Try to notice smells in the air around you, like an air freshener or freshly mowed grass. You may also look around for something that has a scent, such as a flower or an unlit candle. What is 1 thing you can taste? Carry gum, candy, or small snacks for this step. Pop one in your mouth and focus your attention closely on the flavors. Categories   Choose at least three of the categories below and name as many items as you can in each one. Spend a few minutes on each category to come up with as many items as possible.       Movies  Countries  Books  Cereals    Sports Teams  Colors  Cars  Fruits & Vegetables    Animals  [employer]  TV Shows  Famous People      For a variation on this activity, try naming items in a category alphabetically. For example, for the fruits & vegetables category, say \"apple, banana, carrot,\" and so on. Body Awareness   The body awareness technique will bring you into the here-and-now by directing your focus to sensations in the body. Pay special attention to the physical sensations created by each step. Take 5 long, deep breaths through your nose, and exhale through puckered lips. Place both feet flat on the floor. Wiggle your toes. Curl and uncurl your toes several times. Spend a moment noticing the sensations in your feet. Stomp your feet on the ground several times. Pay attention to the sensations in your feet and legs as you make contact with the ground. Clench your hands into fists, then release the tension. Repeat this 10 times. Press your palms together. Press them harder and hold this pose for 15 seconds. Pay attention to the feeling of tension in your hands and arms. Rub your palms together briskly. Notice and sound and the feeling of warmth. Reach your hands over your head like you're trying to reach the leora. Stretch like this for 5 seconds. Bring your arms down and let them relax at your sides. Take 5 more deep breaths and notice the feeling of calm in your body. Mental Exercises   Use mental exercises to take your mind off uncomfortable thoughts and feelings. They are discreet and easy to use at nearly any time or place. [place] to see which work best for you. Name all the objects you see. Describe the steps in performing an activity you know how to do well. For example, how to shoot a basketball, prepare your favorite meal, or tie a knot. Count backwards from 100 by 7.  an object and describe it in detail. Describe its color, texture, size, weight, scent, and any other qualities you notice. Spell your full name, and the names of three other people, backwards.     Name all your family members, their ages, and one of their favorite activities. Read something backwards, letter-by-letter. Practice for at least a few minutes. Think of an object and \"draw\" it in your mind, or in the air with your finger. Try drawing your home, a vehicle, or an animal.     © 2018 [facility] Provided by [employer]

## 2023-03-07 ENCOUNTER — TELEPHONE (OUTPATIENT)
Dept: PRIMARY CARE CLINIC | Age: 48
End: 2023-03-07

## 2023-03-25 PROBLEM — Z12.11 COLON CANCER SCREENING: Status: RESOLVED | Noted: 2023-02-23 | Resolved: 2023-03-25

## 2023-06-01 ENCOUNTER — OFFICE VISIT (OUTPATIENT)
Dept: PAIN MANAGEMENT | Age: 48
End: 2023-06-01

## 2023-06-01 VITALS
BODY MASS INDEX: 34.16 KG/M2 | DIASTOLIC BLOOD PRESSURE: 70 MMHG | WEIGHT: 205 LBS | TEMPERATURE: 98 F | SYSTOLIC BLOOD PRESSURE: 124 MMHG | HEIGHT: 65 IN

## 2023-06-01 DIAGNOSIS — M47.817 LUMBOSACRAL SPONDYLOSIS WITHOUT MYELOPATHY: Primary | ICD-10-CM

## 2023-06-01 DIAGNOSIS — M54.16 LUMBAR RADICULOPATHY: ICD-10-CM

## 2023-06-01 NOTE — PROGRESS NOTES
[address] [place] [address]  Neurosurgery and Pain [address] [facility] [place] [address]: [phone]  F: [phone]        John Lora  (1975)    6/1/2023    Subjective:     John Lora is 57 y.o. female who complains today of:     Chief Complaint   Patient presents with    Back Pain     Patient here today for initial evaluation. Had a laminectomy discectomy with Dr. Madi De Oliveira about 6 months ago patient is having a lot of pain in the left side some on the right side sometimes electrical shocks still going into her feet. She has been seeing Dr. Alhaji Earl the neurologist.  Rakesh Jacqueshew was reviewed. He has been giving her some cortisone shots. She did physical therapy after surgery. She socially smokes. She is an STNA. No blood thinners not diabetic. The pain is affecting her quality of life. She favors the left side more than the right side bending is difficult. Pain is affecting her quality life considerably. Plan: We discussed options we will get x-rays lumbar spine with flexion-extension views to eval for instability. We will order new MRI to evaluate for new herniated disc. She has failed conservative treatment. Questions answered chart was reviewed. He understands plan and is in agreement.     Allergies:  Seasonal    Past Medical History:   Diagnosis Date    Chronic back pain     Chronic headaches     Depression      Past Surgical History:   Procedure Laterality Date    CHOLECYSTECTOMY      COLONOSCOPY N/A 2/23/2023    COLORECTAL CANCER SCREENING, NOT HIGH RISK performed by Koby Arteaga MD at [address] [facility] DISCECTOMY  01/23/2023    SEPTOPLASTY      VULVA SURGERY      abcess removal     Family History   Problem Relation Age of Onset    Arthritis Mother     Colon Cancer Neg Hx      Social History     Socioeconomic History    Marital status: Single     Spouse name: Not on file    Number of children: Not on file

## 2023-06-19 ENCOUNTER — TELEPHONE (OUTPATIENT)
Dept: PAIN MANAGEMENT | Age: 48
End: 2023-06-19

## 2023-06-19 NOTE — TELEPHONE ENCOUNTER
Patient is asking if Dr Magno Iglesias has reviewed her MRI? She says that she would like a nerve block.

## 2023-07-11 ENCOUNTER — INITIAL CONSULT (OUTPATIENT)
Dept: NEUROSURGERY | Age: 48
End: 2023-07-11
Payer: COMMERCIAL

## 2023-07-11 VITALS
HEIGHT: 65 IN | WEIGHT: 205 LBS | BODY MASS INDEX: 34.16 KG/M2 | SYSTOLIC BLOOD PRESSURE: 132 MMHG | TEMPERATURE: 97.4 F | DIASTOLIC BLOOD PRESSURE: 88 MMHG

## 2023-07-11 DIAGNOSIS — M51.27 HERNIATED NUCLEUS PULPOSUS, L5-S1, LEFT: Primary | ICD-10-CM

## 2023-07-11 DIAGNOSIS — F17.200 SMOKER: ICD-10-CM

## 2023-07-11 PROCEDURE — G8427 DOCREV CUR MEDS BY ELIG CLIN: HCPCS | Performed by: NEUROLOGICAL SURGERY

## 2023-07-11 PROCEDURE — G8417 CALC BMI ABV UP PARAM F/U: HCPCS | Performed by: NEUROLOGICAL SURGERY

## 2023-07-11 PROCEDURE — 99243 OFF/OP CNSLTJ NEW/EST LOW 30: CPT | Performed by: NEUROLOGICAL SURGERY

## 2023-07-11 ASSESSMENT — ENCOUNTER SYMPTOMS
SHORTNESS OF BREATH: 0
NAUSEA: 0
EYE PAIN: 0
BACK PAIN: 1

## 2023-09-29 NOTE — PROGRESS NOTES
Patient Name: Imelda Her : 1975        Date: 10/10/2023      Type of Appt: Follow up     Reason for appt: Three month Recheck follow up    Pt last seen by Dr. Guy Barr on: 23    Surgeries: 23- Laminectomy Discectomy Lumbar Level 1 by Dr. Das Comment     Smoking: Yes    Chief complaint back and left lower extremity pain        Referred by No ref. provider found        PAST MEDICAL, FAMILY AND SOCIAL HISTORY:  Past Medical History        Past Medical History:   Diagnosis Date    Chronic back pain      Chronic headaches      Depression           Past Surgical History         Past Surgical History:   Procedure Laterality Date    CHOLECYSTECTOMY        COLONOSCOPY N/A 2023     COLORECTAL CANCER SCREENING, NOT HIGH RISK performed by Amanda Mccray MD at [address] DISCECTOMY   2023    SEPTOPLASTY        VULVA SURGERY         abcess removal         Family History         Family History   Problem Relation Age of Onset    Arthritis Mother      Colon Cancer Neg Hx                  Current Outpatient Medications on File Prior to Visit   Medication Sig Dispense Refill    traZODone (DESYREL) 50 MG tablet Take 2 tablets by mouth nightly as needed for Sleep 60 tablet 3    urea (CARMOL) 10 % cream Apply topically as needed. 85 g 4    NAPROXEN PO Take by mouth        Rimegepant Sulfate (NURTEC PO) Take by mouth        acetaminophen (TYLENOL) 325 MG tablet Take 2 tablets by mouth every 6 hours as needed for Pain        cyclobenzaprine (FLEXERIL) 5 MG tablet TAKE 1 TABLET BY MOUTH EVERY 8 HOURS AS NEEDED FOR MUSCLE SPASM. DO NOT TAKE THIS IF BLOOD PRESSURE IS.  LESS THAN 110        famotidine (PEPCID) 20 MG tablet Take 1 tablet by mouth 2 times daily as needed        orphenadrine (NORFLEX) 100 MG extended release tablet          oxyCODONE (ROXICODONE) 5 MG immediate release tablet TAKE 1 TABLET BY MOUTH EVERY 4 HOURS FOR UP TO 7 DAYS AS NEEDED        STIMULANT LAXATIVE 8.6-50 MG per

## 2023-10-10 ENCOUNTER — PREP FOR PROCEDURE (OUTPATIENT)
Dept: NEUROSURGERY | Age: 48
End: 2023-10-10

## 2023-10-10 ENCOUNTER — OFFICE VISIT (OUTPATIENT)
Dept: NEUROSURGERY | Age: 48
End: 2023-10-10
Payer: COMMERCIAL

## 2023-10-10 VITALS
WEIGHT: 215 LBS | SYSTOLIC BLOOD PRESSURE: 138 MMHG | DIASTOLIC BLOOD PRESSURE: 80 MMHG | HEIGHT: 65 IN | TEMPERATURE: 97 F | BODY MASS INDEX: 35.82 KG/M2

## 2023-10-10 DIAGNOSIS — M51.27 HERNIATED NUCLEUS PULPOSUS, L5-S1, LEFT: Primary | ICD-10-CM

## 2023-10-10 DIAGNOSIS — F17.200 SMOKER: ICD-10-CM

## 2023-10-10 PROCEDURE — 4004F PT TOBACCO SCREEN RCVD TLK: CPT | Performed by: NEUROLOGICAL SURGERY

## 2023-10-10 PROCEDURE — G8484 FLU IMMUNIZE NO ADMIN: HCPCS | Performed by: NEUROLOGICAL SURGERY

## 2023-10-10 PROCEDURE — 99213 OFFICE O/P EST LOW 20 MIN: CPT | Performed by: NEUROLOGICAL SURGERY

## 2023-10-10 PROCEDURE — G8417 CALC BMI ABV UP PARAM F/U: HCPCS | Performed by: NEUROLOGICAL SURGERY

## 2023-10-10 PROCEDURE — G8427 DOCREV CUR MEDS BY ELIG CLIN: HCPCS | Performed by: NEUROLOGICAL SURGERY

## 2023-10-10 RX ORDER — ACETAMINOPHEN 325 MG/1
1000 TABLET ORAL ONCE
OUTPATIENT
Start: 2023-10-10 | End: 2023-10-10

## 2023-10-10 RX ORDER — KETOROLAC TROMETHAMINE 30 MG/ML
30 INJECTION, SOLUTION INTRAMUSCULAR; INTRAVENOUS ONCE
OUTPATIENT
Start: 2023-10-10 | End: 2023-10-10

## 2023-10-10 RX ORDER — SODIUM CHLORIDE 9 MG/ML
INJECTION, SOLUTION INTRAVENOUS PRN
OUTPATIENT
Start: 2023-10-10

## 2023-10-10 RX ORDER — SODIUM CHLORIDE 0.9 % (FLUSH) 0.9 %
5-40 SYRINGE (ML) INJECTION PRN
OUTPATIENT
Start: 2023-10-10

## 2023-10-10 RX ORDER — SODIUM CHLORIDE 0.9 % (FLUSH) 0.9 %
5-40 SYRINGE (ML) INJECTION EVERY 12 HOURS SCHEDULED
OUTPATIENT
Start: 2023-10-10

## 2024-03-21 NOTE — PROGRESS NOTES
Patient Name: Elizabeth Stratton : 1975        Date: 3/28/2024      Type of Appt: Follow up    Reason for appt: FOLLOW UP TO REVIEW RE-SCHEDULING SURGERY (CANCELLED : LEFT L5-S1 LYSIS OF SCAR MICRODISKECTOMY)     Pt last seen by Dr Tsai on 10/10/2023    Studies done: no new studies    Surgeries: 23- Laminectomy Discectomy Lumbar Level 1 by Dr. Mak      Smoking: Yes     Chief complaint back and left lower extremity pain        Referred by No ref. provider found        PAST MEDICAL, FAMILY AND SOCIAL HISTORY:  Past Medical History           Past Medical History:   Diagnosis Date    Chronic back pain      Chronic headaches      Depression           Past Surgical History             Past Surgical History:   Procedure Laterality Date    CHOLECYSTECTOMY        COLONOSCOPY N/A 2023     COLORECTAL CANCER SCREENING, NOT HIGH RISK performed by Omid Chapa MD at [facility]    LUMBAR DISCECTOMY   2023    SEPTOPLASTY        VULVA SURGERY         abcess removal         Family History             Family History   Problem Relation Age of Onset    Arthritis Mother      Colon Cancer Neg Hx                       Current Outpatient Medications on File Prior to Visit   Medication Sig Dispense Refill    traZODone (DESYREL) 50 MG tablet Take 2 tablets by mouth nightly as needed for Sleep 60 tablet 3    urea (CARMOL) 10 % cream Apply topically as needed. 85 g 4    NAPROXEN PO Take by mouth        Rimegepant Sulfate (NURTEC PO) Take by mouth        acetaminophen (TYLENOL) 325 MG tablet Take 2 tablets by mouth every 6 hours as needed for Pain        cyclobenzaprine (FLEXERIL) 5 MG tablet TAKE 1 TABLET BY MOUTH EVERY 8 HOURS AS NEEDED FOR MUSCLE SPASM. DO NOT TAKE THIS IF BLOOD PRESSURE IS. LESS THAN 110        famotidine (PEPCID) 20 MG tablet Take 1 tablet by mouth 2 times daily as needed        orphenadrine (NORFLEX) 100 MG extended release tablet          oxyCODONE (ROXICODONE) 5 MG

## 2024-03-28 ENCOUNTER — PREP FOR PROCEDURE (OUTPATIENT)
Dept: NEUROSURGERY | Age: 49
End: 2024-03-28

## 2024-03-28 ENCOUNTER — OFFICE VISIT (OUTPATIENT)
Dept: NEUROSURGERY | Age: 49
End: 2024-03-28
Payer: COMMERCIAL

## 2024-03-28 VITALS
WEIGHT: 210 LBS | BODY MASS INDEX: 34.99 KG/M2 | TEMPERATURE: 97.8 F | DIASTOLIC BLOOD PRESSURE: 76 MMHG | SYSTOLIC BLOOD PRESSURE: 124 MMHG | HEIGHT: 65 IN

## 2024-03-28 DIAGNOSIS — M51.27 HERNIATED NUCLEUS PULPOSUS, L5-S1, LEFT: Primary | ICD-10-CM

## 2024-03-28 DIAGNOSIS — M46.1 SACROILIITIS (HCC): ICD-10-CM

## 2024-03-28 PROCEDURE — G8484 FLU IMMUNIZE NO ADMIN: HCPCS | Performed by: NEUROLOGICAL SURGERY

## 2024-03-28 PROCEDURE — 99213 OFFICE O/P EST LOW 20 MIN: CPT | Performed by: NEUROLOGICAL SURGERY

## 2024-03-28 PROCEDURE — G8417 CALC BMI ABV UP PARAM F/U: HCPCS | Performed by: NEUROLOGICAL SURGERY

## 2024-03-28 PROCEDURE — G8427 DOCREV CUR MEDS BY ELIG CLIN: HCPCS | Performed by: NEUROLOGICAL SURGERY

## 2024-03-28 PROCEDURE — 4004F PT TOBACCO SCREEN RCVD TLK: CPT | Performed by: NEUROLOGICAL SURGERY

## 2024-03-28 RX ORDER — PREDNISONE 20 MG/1
TABLET ORAL
COMMUNITY
Start: 2024-03-27

## 2024-03-28 RX ORDER — ACETAMINOPHEN 325 MG/1
1000 TABLET ORAL ONCE
OUTPATIENT
Start: 2024-03-28 | End: 2024-03-28

## 2024-03-28 RX ORDER — ONDANSETRON 4 MG/1
4 TABLET, FILM COATED ORAL PRN
COMMUNITY

## 2024-03-28 RX ORDER — SODIUM CHLORIDE 0.9 % (FLUSH) 0.9 %
5-40 SYRINGE (ML) INJECTION PRN
OUTPATIENT
Start: 2024-03-28

## 2024-03-28 RX ORDER — ZOLPIDEM TARTRATE 10 MG/1
10 TABLET ORAL NIGHTLY PRN
COMMUNITY
Start: 2024-02-25

## 2024-03-28 RX ORDER — PANTOPRAZOLE SODIUM 40 MG/1
TABLET, DELAYED RELEASE ORAL
COMMUNITY

## 2024-03-28 RX ORDER — KETOROLAC TROMETHAMINE 30 MG/ML
30 INJECTION, SOLUTION INTRAMUSCULAR; INTRAVENOUS ONCE
OUTPATIENT
Start: 2024-03-28 | End: 2024-03-28

## 2024-03-28 RX ORDER — DEXMETHYLPHENIDATE HYDROCHLORIDE 10 MG/1
10 TABLET ORAL 2 TIMES DAILY
COMMUNITY
Start: 2024-03-05 | End: 2024-04-04

## 2024-03-28 RX ORDER — DOXEPIN HYDROCHLORIDE 100 MG/1
CAPSULE ORAL
COMMUNITY
Start: 2024-02-23

## 2024-03-28 RX ORDER — ALPRAZOLAM 0.5 MG/1
TABLET ORAL
COMMUNITY

## 2024-03-28 RX ORDER — OXYCODONE AND ACETAMINOPHEN 10; 325 MG/1; MG/1
TABLET ORAL
COMMUNITY
Start: 2024-03-23

## 2024-03-28 RX ORDER — VENLAFAXINE HYDROCHLORIDE 150 MG/1
CAPSULE, EXTENDED RELEASE ORAL DAILY
COMMUNITY
Start: 2024-02-23

## 2024-03-28 RX ORDER — SODIUM CHLORIDE 9 MG/ML
INJECTION, SOLUTION INTRAVENOUS PRN
OUTPATIENT
Start: 2024-03-28

## 2024-03-28 RX ORDER — SODIUM CHLORIDE 0.9 % (FLUSH) 0.9 %
5-40 SYRINGE (ML) INJECTION EVERY 12 HOURS SCHEDULED
OUTPATIENT
Start: 2024-03-28

## 2024-04-16 ENCOUNTER — PREP FOR PROCEDURE (OUTPATIENT)
Dept: NEUROSURGERY | Age: 49
End: 2024-04-16

## 2024-04-16 DIAGNOSIS — M51.27 OTHER INTERVERTEBRAL DISC DISPLACEMENT, LUMBOSACRAL REGION: ICD-10-CM

## 2024-06-25 ENCOUNTER — TELEPHONE (OUTPATIENT)
Dept: NEUROSURGERY | Age: 49
End: 2024-06-25

## 2024-06-25 NOTE — TELEPHONE ENCOUNTER
Patient to be temporarily totally disabled beginning 7- until approximately 9-.  [facility] FMLA form filled out and mailed to patient's home address as requested.

## 2024-07-16 ENCOUNTER — PREP FOR PROCEDURE (OUTPATIENT)
Dept: NEUROSURGERY | Age: 49
End: 2024-07-16

## 2024-07-16 RX ORDER — ACETAMINOPHEN 500 MG
1000 TABLET ORAL ONCE
Status: CANCELLED | OUTPATIENT
Start: 2024-07-24 | End: 2024-07-16

## 2024-07-16 RX ORDER — KETOROLAC TROMETHAMINE 30 MG/ML
30 INJECTION, SOLUTION INTRAMUSCULAR; INTRAVENOUS ONCE
Status: CANCELLED | OUTPATIENT
Start: 2024-07-24 | End: 2024-07-16

## 2024-07-16 RX ORDER — SODIUM CHLORIDE 0.9 % (FLUSH) 0.9 %
5-40 SYRINGE (ML) INJECTION PRN
Status: CANCELLED | OUTPATIENT
Start: 2024-07-24

## 2024-07-16 RX ORDER — SODIUM CHLORIDE 9 MG/ML
INJECTION, SOLUTION INTRAVENOUS PRN
Status: CANCELLED | OUTPATIENT
Start: 2024-07-24

## 2024-07-16 RX ORDER — SODIUM CHLORIDE 0.9 % (FLUSH) 0.9 %
5-40 SYRINGE (ML) INJECTION EVERY 12 HOURS SCHEDULED
Status: CANCELLED | OUTPATIENT
Start: 2024-07-24

## 2024-07-16 RX ORDER — DEXAMETHASONE SODIUM PHOSPHATE 4 MG/ML
8 INJECTION, SOLUTION INTRA-ARTICULAR; INTRALESIONAL; INTRAMUSCULAR; INTRAVENOUS; SOFT TISSUE ONCE
Status: CANCELLED | OUTPATIENT
Start: 2024-07-24 | End: 2024-07-16

## 2024-07-17 ENCOUNTER — HOSPITAL ENCOUNTER (OUTPATIENT)
Dept: PREADMISSION TESTING | Age: 49
Discharge: HOME | End: 2024-07-21
Payer: COMMERCIAL

## 2024-07-17 VITALS
SYSTOLIC BLOOD PRESSURE: 133 MMHG | HEART RATE: 76 BPM | TEMPERATURE: 97.5 F | HEIGHT: 65 IN | RESPIRATION RATE: 20 BRPM | WEIGHT: 216.8 LBS | DIASTOLIC BLOOD PRESSURE: 85 MMHG | BODY MASS INDEX: 36.12 KG/M2 | OXYGEN SATURATION: 98 %

## 2024-07-17 PROBLEM — D64.9 ANEMIA: Status: ACTIVE | Noted: 2018-05-16

## 2024-07-17 PROBLEM — F41.9 ANXIETY: Status: ACTIVE | Noted: 2023-07-10

## 2024-07-17 PROBLEM — F98.8 ADD (ATTENTION DEFICIT DISORDER) WITHOUT HYPERACTIVITY: Status: ACTIVE | Noted: 2024-02-08

## 2024-07-17 PROBLEM — R56.9 SEIZURE (HCC): Status: ACTIVE | Noted: 2020-12-24

## 2024-07-17 LAB
ABO + RH BLD: NORMAL
ANION GAP SERPL CALCULATED.3IONS-SCNC: 9 MEQ/L (ref 9–15)
APTT PPP: 29.3 SEC (ref 24.4–36.8)
BASOPHILS # BLD: 0 K/UL (ref 0–0.2)
BASOPHILS NFR BLD: 0.6 %
BLD GP AB SCN SERPL QL: NORMAL
BUN SERPL-MCNC: 11 MG/DL (ref 6–20)
CALCIUM SERPL-MCNC: 9.7 MG/DL (ref 8.5–9.9)
CHLORIDE SERPL-SCNC: 102 MEQ/L (ref 95–107)
CO2 SERPL-SCNC: 27 MEQ/L (ref 20–31)
CREAT SERPL-MCNC: 0.73 MG/DL (ref 0.5–0.9)
EOSINOPHIL # BLD: 0.1 K/UL (ref 0–0.7)
EOSINOPHIL NFR BLD: 1.9 %
ERYTHROCYTE [DISTWIDTH] IN BLOOD BY AUTOMATED COUNT: 17.4 % (ref 11.5–14.5)
GLUCOSE SERPL-MCNC: 115 MG/DL (ref 70–99)
HCT VFR BLD AUTO: 35.8 % (ref 37–47)
HGB BLD-MCNC: 11.2 G/DL (ref 12–16)
INR PPP: 0.9
LYMPHOCYTES # BLD: 2.6 K/UL (ref 1–4.8)
LYMPHOCYTES NFR BLD: 40.7 %
MCH RBC QN AUTO: 25.6 PG (ref 27–31.3)
MCHC RBC AUTO-ENTMCNC: 31.3 % (ref 33–37)
MCV RBC AUTO: 81.9 FL (ref 79.4–94.8)
MONOCYTES # BLD: 0.6 K/UL (ref 0.2–0.8)
MONOCYTES NFR BLD: 9.4 %
NEUTROPHILS # BLD: 3 K/UL (ref 1.4–6.5)
NEUTS SEG NFR BLD: 46.8 %
PLATELET # BLD AUTO: 309 K/UL (ref 130–400)
POTASSIUM SERPL-SCNC: 3.6 MEQ/L (ref 3.4–4.9)
PROTHROMBIN TIME: 12.7 SEC (ref 12.3–14.9)
RBC # BLD AUTO: 4.37 M/UL (ref 4.2–5.4)
SODIUM SERPL-SCNC: 138 MEQ/L (ref 135–144)
WBC # BLD AUTO: 6.4 K/UL (ref 4.8–10.8)

## 2024-07-17 PROCEDURE — 85730 THROMBOPLASTIN TIME PARTIAL: CPT

## 2024-07-17 PROCEDURE — 86901 BLOOD TYPING SEROLOGIC RH(D): CPT

## 2024-07-17 PROCEDURE — 86900 BLOOD TYPING SEROLOGIC ABO: CPT

## 2024-07-17 PROCEDURE — 85610 PROTHROMBIN TIME: CPT

## 2024-07-17 PROCEDURE — 85025 COMPLETE CBC W/AUTO DIFF WBC: CPT

## 2024-07-17 PROCEDURE — 80048 BASIC METABOLIC PNL TOTAL CA: CPT

## 2024-07-17 PROCEDURE — 87641 MR-STAPH DNA AMP PROBE: CPT

## 2024-07-17 PROCEDURE — 86850 RBC ANTIBODY SCREEN: CPT

## 2024-07-17 RX ORDER — UBROGEPANT 100 MG/1
1 TABLET ORAL PRN
Status: ON HOLD | COMMUNITY
Start: 2024-07-09 | End: 2024-07-26 | Stop reason: HOSPADM

## 2024-07-17 RX ORDER — MONTELUKAST SODIUM 10 MG/1
10 TABLET ORAL NIGHTLY
COMMUNITY
Start: 2024-05-28 | End: 2025-05-28

## 2024-07-17 NOTE — H&P
Preoperative Consultation      Name: Elizabeth Stratton  YOB: 1975  Date of Service: 7/17/2024  PCP: Monica Moody MD    CHIEF COMPLAINT:  other intervertebral disc displacement lumbosacral region    HISTORY OF PRESENT ILLNESS:      The patient is a 49 y.o. female with significant past medical history of HTN, HPL, anemia, arthritis, sacroiliitis, anxiety, ADD, multiple sclerosis, seizure (pt reports last known seizure 4 yrs ago, does not take seizure medication) who presents for a preoperative consultation at the request of surgeon, Dr. Tsai, who plans on performing lumbar 5-sacral 1 lysis of scar microdiskectomy on 7/24/24 at [facility].     Pt reports hx chronic low back pain that radiates down into left leg and left foot. Pt rates pain today 5/10 and describes it as \"sharp electrical shocks and achy constant pain\". Pt reports she takes oxycodone for pain. Pt reports sitting, standing, walking for long periods of time worsen the pain. Pt reports she has tried physical therapy with little relief in symptoms. Pt reports numbness and tingling to her left leg and left foot. Pt reports bilateral lower extremity weakness. Pt follows with Dr. Hines neurology. Pt reports prior laminectomy discectomy 1/23/23 at [facility] with Dr. Mak. Pt reports that surgery never improved her symptoms. Pt denies bowel or bladder symptoms.     Known Anesthesia Problems: None  Bleeding Risk: Hx anemia, hx blood transfusion  Patient Objection to Receiving Blood Products: No  Personal of FH of DVT/PE: No    Medical/Cardiac Clearance: No    Past Medical History:        Diagnosis Date    Arthritis     Chronic back pain     Chronic headaches     Depression     History of blood transfusion     Hyperlipidemia     Hypertension      Past Surgical History:    Past Surgical History:   Procedure Laterality Date    CHOLECYSTECTOMY      COLONOSCOPY N/A 02/23/2023    COLORECTAL CANCER SCREENING, NOT HIGH RISK performed by Omid

## 2024-07-17 NOTE — H&P (VIEW-ONLY)
Preoperative Consultation      Name: Elizabeth Stratton  YOB: 1975  Date of Service: 7/17/2024  PCP: Monica Moody MD    CHIEF COMPLAINT:  other intervertebral disc displacement lumbosacral region    HISTORY OF PRESENT ILLNESS:      The patient is a 49 y.o. female with significant past medical history of HTN, HPL, anemia, arthritis, sacroiliitis, anxiety, ADD, multiple sclerosis, seizure (pt reports last known seizure 4 yrs ago, does not take seizure medication) who presents for a preoperative consultation at the request of surgeon, Dr. Tsai, who plans on performing lumbar 5-sacral 1 lysis of scar microdiskectomy on 7/24/24 at [facility].     Pt reports hx chronic low back pain that radiates down into left leg and left foot. Pt rates pain today 5/10 and describes it as \"sharp electrical shocks and achy constant pain\". Pt reports she takes oxycodone for pain. Pt reports sitting, standing, walking for long periods of time worsen the pain. Pt reports she has tried physical therapy with little relief in symptoms. Pt reports numbness and tingling to her left leg and left foot. Pt reports bilateral lower extremity weakness. Pt follows with Dr. Hines neurology. Pt reports prior laminectomy discectomy 1/23/23 at [facility] with Dr. Mak. Pt reports that surgery never improved her symptoms. Pt denies bowel or bladder symptoms.     Known Anesthesia Problems: None  Bleeding Risk: Hx anemia, hx blood transfusion  Patient Objection to Receiving Blood Products: No  Personal of FH of DVT/PE: No    Medical/Cardiac Clearance: No    Past Medical History:        Diagnosis Date    Arthritis     Chronic back pain     Chronic headaches     Depression     History of blood transfusion     Hyperlipidemia     Hypertension      Past Surgical History:    Past Surgical History:   Procedure Laterality Date    CHOLECYSTECTOMY      COLONOSCOPY N/A 02/23/2023    COLORECTAL CANCER SCREENING, NOT HIGH RISK performed by Omid  Diabetes Maternal Grandmother     Heart Disease Maternal Grandmother     Cancer Maternal Grandmother     Cancer Maternal Grandfather     Diabetes Maternal Grandfather     Cancer Paternal Grandmother     Stroke Paternal Grandmother     Anorexia Nervosa Daughter     Colon Cancer Neg Hx        Review of Systems   Constitutional:  Positive for fatigue (chronic). Negative for appetite change, chills, fever and unexpected weight change.   HENT:  Negative for congestion, ear discharge, ear pain, hearing loss, mouth sores, nosebleeds, postnasal drip, rhinorrhea, sinus pressure, sinus pain, sneezing, sore throat, tinnitus and trouble swallowing.    Eyes:  Negative for photophobia, pain, discharge, redness, itching and visual disturbance.        Right eye watery      Respiratory:  Negative for cough, chest tightness, shortness of breath and wheezing.    Cardiovascular:  Negative for chest pain, palpitations and leg swelling.   Gastrointestinal:  Negative for abdominal distention, abdominal pain, blood in stool, constipation, diarrhea, nausea and vomiting.   Endocrine: Negative.    Genitourinary:  Negative for difficulty urinating, dysuria, frequency, hematuria and urgency.   Musculoskeletal:  Positive for arthralgias, back pain, gait problem (no assistive devices, no hx falls), neck pain and neck stiffness.   Skin:  Negative for rash and wound.   Allergic/Immunologic: Negative.    Neurological:  Positive for seizures (last known seizure 4 yrs per pt-pt denies any current seizure medications), weakness (bilateral lower extremities), numbness (left leg, left foot) and headaches (hx migraines). Negative for dizziness, tremors, syncope and light-headedness.   Hematological:  Bruises/bleeds easily.   Psychiatric/Behavioral:  Positive for sleep disturbance. Negative for confusion and hallucinations.         Hx depression and anxiety  Hx ADD       Vitals:  /85   Pulse 76   Temp 97.5 °F (36.4 °C) (Temporal)   Resp 20   Ht

## 2024-07-18 ENCOUNTER — TELEPHONE (OUTPATIENT)
Dept: PAIN MANAGEMENT | Age: 49
End: 2024-07-18

## 2024-07-18 LAB
MRSA, DNA, NASAL: NEGATIVE
SPECIMEN DESCRIPTION: NORMAL

## 2024-07-18 NOTE — TELEPHONE ENCOUNTER
Patient called in to let Dr. Tsai know that her place of work is sending over a leave of absence form that they need filled out on her behalf. Once completed, she is asking to be notified.

## 2024-07-23 ENCOUNTER — ANESTHESIA EVENT (OUTPATIENT)
Dept: OPERATING ROOM | Age: 49
End: 2024-07-23
Payer: COMMERCIAL

## 2024-07-23 NOTE — ANESTHESIA PRE PROCEDURE
PM    RDW 17.4 07/17/2024 12:06 PM     07/17/2024 12:06 PM       CMP:   Lab Results   Component Value Date/Time     07/17/2024 12:06 PM    K 3.6 07/17/2024 12:06 PM     07/17/2024 12:06 PM    CO2 27 07/17/2024 12:06 PM    BUN 11 07/17/2024 12:06 PM    CREATININE 0.73 07/17/2024 12:06 PM    GFRAA >60.0 12/21/2020 12:00 AM    LABGLOM >90.0 07/17/2024 12:06 PM    LABGLOM >60.0 12/09/2022 09:35 AM    GLUCOSE 115 07/17/2024 12:06 PM    CALCIUM 9.7 07/17/2024 12:06 PM    BILITOT 0.3 12/09/2022 09:35 AM    ALKPHOS 74 12/09/2022 09:35 AM    AST 13 12/09/2022 09:35 AM    ALT 15 12/09/2022 09:35 AM       POC Tests: No results for input(s): \"POCGLU\", \"POCNA\", \"POCK\", \"POCCL\", \"POCBUN\", \"POCHEMO\", \"POCHCT\" in the last 72 hours.    Coags:   Lab Results   Component Value Date/Time    PROTIME 12.7 07/17/2024 12:06 PM    INR 0.9 07/17/2024 12:06 PM    APTT 29.3 07/17/2024 12:06 PM       HCG (If Applicable):   Lab Results   Component Value Date    PREGTESTUR negative 12/21/2020        ABGs: No results found for: \"PHART\", \"PO2ART\", \"JCN7BEA\", \"HMR6HNN\", \"BEART\", \"E9TSOLTA\"     Type & Screen (If Applicable):  No results found for: \"LABABO\"    Drug/Infectious Status (If Applicable):  Lab Results   Component Value Date/Time    HEPCAB NONREACTIVE 12/09/2022 09:35 AM       COVID-19 Screening (If Applicable): No results found for: \"COVID19\"        Anesthesia Evaluation  Patient summary reviewed and Nursing notes reviewed   no history of anesthetic complications:   Airway: Mallampati: III  TM distance: >3 FB   Neck ROM: full  Mouth opening: > = 3 FB   Dental:    (+) upper dentures and edentulous      Pulmonary:Negative Pulmonary ROS and normal exam                               Cardiovascular:  Exercise tolerance: good (>4 METS)  (+) hypertension:, hyperlipidemia      ECG reviewed               Beta Blocker:  Not on Beta Blocker         Neuro/Psych:   (+) seizures:, neuromuscular disease: multiple sclerosis, headaches:,

## 2024-07-24 ENCOUNTER — APPOINTMENT (OUTPATIENT)
Dept: GENERAL RADIOLOGY | Age: 49
End: 2024-07-24
Attending: NEUROLOGICAL SURGERY
Payer: COMMERCIAL

## 2024-07-24 ENCOUNTER — ANESTHESIA (OUTPATIENT)
Dept: OPERATING ROOM | Age: 49
End: 2024-07-24
Payer: COMMERCIAL

## 2024-07-24 ENCOUNTER — HOSPITAL ENCOUNTER (OUTPATIENT)
Age: 49
Setting detail: OBSERVATION
Discharge: HOME | End: 2024-07-26
Attending: NEUROLOGICAL SURGERY | Admitting: NEUROLOGICAL SURGERY
Payer: COMMERCIAL

## 2024-07-24 DIAGNOSIS — M51.26 LUMBAR DISC HERNIATION: Primary | ICD-10-CM

## 2024-07-24 LAB
HCG, URINE, POC: NEGATIVE
Lab: NORMAL
NEGATIVE QC PASS/FAIL: NORMAL
POSITIVE QC PASS/FAIL: NORMAL

## 2024-07-24 PROCEDURE — 2500000003 HC RX 250 WO HCPCS: Performed by: NURSE ANESTHETIST, CERTIFIED REGISTERED

## 2024-07-24 PROCEDURE — G0378 HOSPITAL OBSERVATION PER HR: HCPCS

## 2024-07-24 PROCEDURE — 6360000002 HC RX W HCPCS: Performed by: NURSE ANESTHETIST, CERTIFIED REGISTERED

## 2024-07-24 PROCEDURE — 2580000003 HC RX 258: Performed by: NEUROLOGICAL SURGERY

## 2024-07-24 PROCEDURE — 3600000014 HC SURGERY LEVEL 4 ADDTL 15MIN: Performed by: NEUROLOGICAL SURGERY

## 2024-07-24 PROCEDURE — 7100000001 HC PACU RECOVERY - ADDTL 15 MIN: Performed by: NEUROLOGICAL SURGERY

## 2024-07-24 PROCEDURE — 2709999900 HC NON-CHARGEABLE SUPPLY: Performed by: NEUROLOGICAL SURGERY

## 2024-07-24 PROCEDURE — 2720000010 HC SURG SUPPLY STERILE: Performed by: NEUROLOGICAL SURGERY

## 2024-07-24 PROCEDURE — 2580000003 HC RX 258: Performed by: STUDENT IN AN ORGANIZED HEALTH CARE EDUCATION/TRAINING PROGRAM

## 2024-07-24 PROCEDURE — 2500000003 HC RX 250 WO HCPCS: Performed by: NEUROLOGICAL SURGERY

## 2024-07-24 PROCEDURE — A4217 STERILE WATER/SALINE, 500 ML: HCPCS | Performed by: NEUROLOGICAL SURGERY

## 2024-07-24 PROCEDURE — 97166 OT EVAL MOD COMPLEX 45 MIN: CPT

## 2024-07-24 PROCEDURE — 6360000002 HC RX W HCPCS: Performed by: NEUROLOGICAL SURGERY

## 2024-07-24 PROCEDURE — 6370000000 HC RX 637 (ALT 250 FOR IP): Performed by: NEUROLOGICAL SURGERY

## 2024-07-24 PROCEDURE — 7100000000 HC PACU RECOVERY - FIRST 15 MIN: Performed by: NEUROLOGICAL SURGERY

## 2024-07-24 PROCEDURE — 3600000004 HC SURGERY LEVEL 4 BASE: Performed by: NEUROLOGICAL SURGERY

## 2024-07-24 PROCEDURE — 3700000000 HC ANESTHESIA ATTENDED CARE: Performed by: NEUROLOGICAL SURGERY

## 2024-07-24 PROCEDURE — C1713 ANCHOR/SCREW BN/BN,TIS/BN: HCPCS | Performed by: NEUROLOGICAL SURGERY

## 2024-07-24 PROCEDURE — 97162 PT EVAL MOD COMPLEX 30 MIN: CPT

## 2024-07-24 PROCEDURE — 6360000002 HC RX W HCPCS: Performed by: STUDENT IN AN ORGANIZED HEALTH CARE EDUCATION/TRAINING PROGRAM

## 2024-07-24 PROCEDURE — 3700000001 HC ADD 15 MINUTES (ANESTHESIA): Performed by: NEUROLOGICAL SURGERY

## 2024-07-24 PROCEDURE — 94150 VITAL CAPACITY TEST: CPT

## 2024-07-24 RX ORDER — POLYETHYLENE GLYCOL 3350 17 G/17G
17 POWDER, FOR SOLUTION ORAL DAILY PRN
Status: DISCONTINUED | OUTPATIENT
Start: 2024-07-24 | End: 2024-07-26 | Stop reason: HOSPADM

## 2024-07-24 RX ORDER — PANTOPRAZOLE SODIUM 40 MG/1
40 TABLET, DELAYED RELEASE ORAL DAILY PRN
Status: DISCONTINUED | OUTPATIENT
Start: 2024-07-24 | End: 2024-07-26 | Stop reason: HOSPADM

## 2024-07-24 RX ORDER — SODIUM CHLORIDE 9 MG/ML
INJECTION, SOLUTION INTRAVENOUS PRN
Status: DISCONTINUED | OUTPATIENT
Start: 2024-07-24 | End: 2024-07-26 | Stop reason: HOSPADM

## 2024-07-24 RX ORDER — ONDANSETRON 2 MG/ML
INJECTION INTRAMUSCULAR; INTRAVENOUS PRN
Status: DISCONTINUED | OUTPATIENT
Start: 2024-07-24 | End: 2024-07-24 | Stop reason: SDUPTHER

## 2024-07-24 RX ORDER — LIDOCAINE HYDROCHLORIDE 10 MG/ML
1 INJECTION, SOLUTION EPIDURAL; INFILTRATION; INTRACAUDAL; PERINEURAL
Status: DISCONTINUED | OUTPATIENT
Start: 2024-07-24 | End: 2024-07-24 | Stop reason: HOSPADM

## 2024-07-24 RX ORDER — SODIUM CHLORIDE 9 MG/ML
INJECTION, SOLUTION INTRAVENOUS CONTINUOUS
Status: DISCONTINUED | OUTPATIENT
Start: 2024-07-24 | End: 2024-07-26 | Stop reason: HOSPADM

## 2024-07-24 RX ORDER — ONDANSETRON 2 MG/ML
4 INJECTION INTRAMUSCULAR; INTRAVENOUS
Status: DISCONTINUED | OUTPATIENT
Start: 2024-07-24 | End: 2024-07-24 | Stop reason: HOSPADM

## 2024-07-24 RX ORDER — ONDANSETRON 2 MG/ML
4 INJECTION INTRAMUSCULAR; INTRAVENOUS EVERY 6 HOURS PRN
Status: DISCONTINUED | OUTPATIENT
Start: 2024-07-24 | End: 2024-07-26 | Stop reason: HOSPADM

## 2024-07-24 RX ORDER — SODIUM CHLORIDE 0.9 % (FLUSH) 0.9 %
5-40 SYRINGE (ML) INJECTION EVERY 12 HOURS SCHEDULED
Status: DISCONTINUED | OUTPATIENT
Start: 2024-07-24 | End: 2024-07-24 | Stop reason: HOSPADM

## 2024-07-24 RX ORDER — KETOROLAC TROMETHAMINE 30 MG/ML
30 INJECTION, SOLUTION INTRAMUSCULAR; INTRAVENOUS EVERY 6 HOURS
Status: DISCONTINUED | OUTPATIENT
Start: 2024-07-24 | End: 2024-07-26 | Stop reason: HOSPADM

## 2024-07-24 RX ORDER — LIDOCAINE HYDROCHLORIDE AND EPINEPHRINE 10; 10 MG/ML; UG/ML
INJECTION, SOLUTION INFILTRATION; PERINEURAL PRN
Status: DISCONTINUED | OUTPATIENT
Start: 2024-07-24 | End: 2024-07-24 | Stop reason: HOSPADM

## 2024-07-24 RX ORDER — DEXAMETHASONE SODIUM PHOSPHATE 10 MG/ML
INJECTION INTRAMUSCULAR; INTRAVENOUS PRN
Status: DISCONTINUED | OUTPATIENT
Start: 2024-07-24 | End: 2024-07-24 | Stop reason: SDUPTHER

## 2024-07-24 RX ORDER — ACETAMINOPHEN 325 MG/1
650 TABLET ORAL EVERY 6 HOURS
Status: DISCONTINUED | OUTPATIENT
Start: 2024-07-24 | End: 2024-07-26 | Stop reason: HOSPADM

## 2024-07-24 RX ORDER — ACETAMINOPHEN 325 MG/1
650 TABLET ORAL
Status: DISCONTINUED | OUTPATIENT
Start: 2024-07-24 | End: 2024-07-24 | Stop reason: HOSPADM

## 2024-07-24 RX ORDER — ACETAMINOPHEN 500 MG
1000 TABLET ORAL ONCE
Status: COMPLETED | OUTPATIENT
Start: 2024-07-24 | End: 2024-07-24

## 2024-07-24 RX ORDER — FENTANYL CITRATE 50 UG/ML
INJECTION, SOLUTION INTRAMUSCULAR; INTRAVENOUS PRN
Status: DISCONTINUED | OUTPATIENT
Start: 2024-07-24 | End: 2024-07-24 | Stop reason: SDUPTHER

## 2024-07-24 RX ORDER — DEXAMETHASONE SODIUM PHOSPHATE 4 MG/ML
8 INJECTION, SOLUTION INTRA-ARTICULAR; INTRALESIONAL; INTRAMUSCULAR; INTRAVENOUS; SOFT TISSUE ONCE
Status: COMPLETED | OUTPATIENT
Start: 2024-07-24 | End: 2024-07-24

## 2024-07-24 RX ORDER — ALPRAZOLAM 0.5 MG/1
0.5 TABLET ORAL DAILY PRN
Status: DISCONTINUED | OUTPATIENT
Start: 2024-07-24 | End: 2024-07-26 | Stop reason: HOSPADM

## 2024-07-24 RX ORDER — KETOROLAC TROMETHAMINE 30 MG/ML
30 INJECTION, SOLUTION INTRAMUSCULAR; INTRAVENOUS ONCE
Status: COMPLETED | OUTPATIENT
Start: 2024-07-24 | End: 2024-07-24

## 2024-07-24 RX ORDER — OXYCODONE HYDROCHLORIDE 5 MG/1
5 TABLET ORAL EVERY 6 HOURS PRN
Status: DISCONTINUED | OUTPATIENT
Start: 2024-07-24 | End: 2024-07-26 | Stop reason: HOSPADM

## 2024-07-24 RX ORDER — SODIUM CHLORIDE 9 MG/ML
INJECTION, SOLUTION INTRAVENOUS PRN
Status: DISCONTINUED | OUTPATIENT
Start: 2024-07-24 | End: 2024-07-24 | Stop reason: HOSPADM

## 2024-07-24 RX ORDER — FENTANYL CITRATE 0.05 MG/ML
25 INJECTION, SOLUTION INTRAMUSCULAR; INTRAVENOUS EVERY 5 MIN PRN
Status: DISCONTINUED | OUTPATIENT
Start: 2024-07-24 | End: 2024-07-24 | Stop reason: HOSPADM

## 2024-07-24 RX ORDER — SODIUM CHLORIDE 0.9 % (FLUSH) 0.9 %
5-40 SYRINGE (ML) INJECTION PRN
Status: DISCONTINUED | OUTPATIENT
Start: 2024-07-24 | End: 2024-07-26 | Stop reason: HOSPADM

## 2024-07-24 RX ORDER — SODIUM CHLORIDE 0.9 % (FLUSH) 0.9 %
5-40 SYRINGE (ML) INJECTION EVERY 12 HOURS SCHEDULED
Status: DISCONTINUED | OUTPATIENT
Start: 2024-07-24 | End: 2024-07-26 | Stop reason: HOSPADM

## 2024-07-24 RX ORDER — LIDOCAINE HYDROCHLORIDE 20 MG/ML
INJECTION, SOLUTION INTRAVENOUS PRN
Status: DISCONTINUED | OUTPATIENT
Start: 2024-07-24 | End: 2024-07-24 | Stop reason: SDUPTHER

## 2024-07-24 RX ORDER — SODIUM CHLORIDE 0.9 % (FLUSH) 0.9 %
5-40 SYRINGE (ML) INJECTION PRN
Status: DISCONTINUED | OUTPATIENT
Start: 2024-07-24 | End: 2024-07-24 | Stop reason: HOSPADM

## 2024-07-24 RX ORDER — GABAPENTIN 400 MG/1
800 CAPSULE ORAL 2 TIMES DAILY
Status: DISCONTINUED | OUTPATIENT
Start: 2024-07-24 | End: 2024-07-26 | Stop reason: HOSPADM

## 2024-07-24 RX ORDER — SODIUM CHLORIDE, SODIUM LACTATE, POTASSIUM CHLORIDE, CALCIUM CHLORIDE 600; 310; 30; 20 MG/100ML; MG/100ML; MG/100ML; MG/100ML
INJECTION, SOLUTION INTRAVENOUS CONTINUOUS
Status: DISCONTINUED | OUTPATIENT
Start: 2024-07-24 | End: 2024-07-24 | Stop reason: HOSPADM

## 2024-07-24 RX ORDER — MAGNESIUM HYDROXIDE 1200 MG/15ML
LIQUID ORAL CONTINUOUS PRN
Status: DISCONTINUED | OUTPATIENT
Start: 2024-07-24 | End: 2024-07-24 | Stop reason: HOSPADM

## 2024-07-24 RX ORDER — OXYCODONE HYDROCHLORIDE 5 MG/1
5 TABLET ORAL
Status: DISCONTINUED | OUTPATIENT
Start: 2024-07-24 | End: 2024-07-24 | Stop reason: HOSPADM

## 2024-07-24 RX ORDER — PROPOFOL 10 MG/ML
INJECTION, EMULSION INTRAVENOUS PRN
Status: DISCONTINUED | OUTPATIENT
Start: 2024-07-24 | End: 2024-07-24 | Stop reason: SDUPTHER

## 2024-07-24 RX ORDER — OXYCODONE HYDROCHLORIDE AND ACETAMINOPHEN 5; 325 MG/1; MG/1
1 TABLET ORAL EVERY 6 HOURS PRN
Qty: 56 TABLET | Refills: 0 | Status: SHIPPED | OUTPATIENT
Start: 2024-07-24 | End: 2024-08-07

## 2024-07-24 RX ORDER — NALOXONE HYDROCHLORIDE 0.4 MG/ML
INJECTION, SOLUTION INTRAMUSCULAR; INTRAVENOUS; SUBCUTANEOUS PRN
Status: DISCONTINUED | OUTPATIENT
Start: 2024-07-24 | End: 2024-07-24 | Stop reason: HOSPADM

## 2024-07-24 RX ORDER — ONDANSETRON 4 MG/1
4 TABLET, FILM COATED ORAL EVERY 8 HOURS PRN
Status: DISCONTINUED | OUTPATIENT
Start: 2024-07-24 | End: 2024-07-26 | Stop reason: HOSPADM

## 2024-07-24 RX ORDER — MIDAZOLAM HYDROCHLORIDE 1 MG/ML
INJECTION INTRAMUSCULAR; INTRAVENOUS PRN
Status: DISCONTINUED | OUTPATIENT
Start: 2024-07-24 | End: 2024-07-24 | Stop reason: SDUPTHER

## 2024-07-24 RX ORDER — ROCURONIUM BROMIDE 10 MG/ML
INJECTION, SOLUTION INTRAVENOUS PRN
Status: DISCONTINUED | OUTPATIENT
Start: 2024-07-24 | End: 2024-07-24 | Stop reason: SDUPTHER

## 2024-07-24 RX ORDER — DOXEPIN HYDROCHLORIDE 50 MG/1
100 CAPSULE ORAL NIGHTLY PRN
Status: DISCONTINUED | OUTPATIENT
Start: 2024-07-24 | End: 2024-07-26 | Stop reason: HOSPADM

## 2024-07-24 RX ORDER — BISACODYL 5 MG/1
5 TABLET, DELAYED RELEASE ORAL DAILY
Status: DISCONTINUED | OUTPATIENT
Start: 2024-07-24 | End: 2024-07-26 | Stop reason: HOSPADM

## 2024-07-24 RX ORDER — METOCLOPRAMIDE HYDROCHLORIDE 5 MG/ML
10 INJECTION INTRAMUSCULAR; INTRAVENOUS
Status: DISCONTINUED | OUTPATIENT
Start: 2024-07-24 | End: 2024-07-24 | Stop reason: HOSPADM

## 2024-07-24 RX ORDER — OXYCODONE HYDROCHLORIDE 5 MG/1
5 TABLET ORAL EVERY 4 HOURS PRN
Status: DISCONTINUED | OUTPATIENT
Start: 2024-07-24 | End: 2024-07-26 | Stop reason: HOSPADM

## 2024-07-24 RX ADMIN — FENTANYL CITRATE 50 MCG: 50 INJECTION, SOLUTION INTRAMUSCULAR; INTRAVENOUS at 08:07

## 2024-07-24 RX ADMIN — ONDANSETRON 4 MG: 2 INJECTION INTRAMUSCULAR; INTRAVENOUS at 12:00

## 2024-07-24 RX ADMIN — ACETAMINOPHEN 1000 MG: 500 TABLET ORAL at 06:56

## 2024-07-24 RX ADMIN — HYDROMORPHONE HYDROCHLORIDE 0.5 MG: 1 INJECTION, SOLUTION INTRAMUSCULAR; INTRAVENOUS; SUBCUTANEOUS at 19:51

## 2024-07-24 RX ADMIN — SODIUM CHLORIDE, POTASSIUM CHLORIDE, SODIUM LACTATE AND CALCIUM CHLORIDE: 600; 310; 30; 20 INJECTION, SOLUTION INTRAVENOUS at 10:03

## 2024-07-24 RX ADMIN — FENTANYL CITRATE 25 MCG: 50 INJECTION, SOLUTION INTRAMUSCULAR; INTRAVENOUS at 12:37

## 2024-07-24 RX ADMIN — LIDOCAINE HYDROCHLORIDE 100 MG: 20 INJECTION, SOLUTION INTRAVENOUS at 07:36

## 2024-07-24 RX ADMIN — KETOROLAC TROMETHAMINE 30 MG: 30 INJECTION INTRAMUSCULAR; INTRAVENOUS at 22:11

## 2024-07-24 RX ADMIN — OXYCODONE HYDROCHLORIDE 5 MG: 5 TABLET ORAL at 16:14

## 2024-07-24 RX ADMIN — PROPOFOL 200 MG: 10 INJECTION, EMULSION INTRAVENOUS at 07:36

## 2024-07-24 RX ADMIN — FENTANYL CITRATE 25 MCG: 50 INJECTION, SOLUTION INTRAMUSCULAR; INTRAVENOUS at 12:08

## 2024-07-24 RX ADMIN — ROCURONIUM BROMIDE 20 MG: 10 INJECTION, SOLUTION INTRAVENOUS at 09:45

## 2024-07-24 RX ADMIN — FENTANYL CITRATE 25 MCG: 50 INJECTION, SOLUTION INTRAMUSCULAR; INTRAVENOUS at 12:30

## 2024-07-24 RX ADMIN — CEFAZOLIN 2000 MG: 2 INJECTION, POWDER, FOR SOLUTION INTRAMUSCULAR; INTRAVENOUS at 07:48

## 2024-07-24 RX ADMIN — OXYCODONE HYDROCHLORIDE 5 MG: 5 TABLET ORAL at 23:35

## 2024-07-24 RX ADMIN — BISACODYL 5 MG: 5 TABLET, COATED ORAL at 16:49

## 2024-07-24 RX ADMIN — KETOROLAC TROMETHAMINE 30 MG: 30 INJECTION INTRAMUSCULAR; INTRAVENOUS at 16:14

## 2024-07-24 RX ADMIN — DEXAMETHASONE SODIUM PHOSPHATE 8 MG: 4 INJECTION INTRA-ARTICULAR; INTRALESIONAL; INTRAMUSCULAR; INTRAVENOUS; SOFT TISSUE at 07:03

## 2024-07-24 RX ADMIN — ROCURONIUM BROMIDE 20 MG: 10 INJECTION, SOLUTION INTRAVENOUS at 08:36

## 2024-07-24 RX ADMIN — SUGAMMADEX 200 MG: 100 INJECTION, SOLUTION INTRAVENOUS at 12:23

## 2024-07-24 RX ADMIN — CEFAZOLIN 2000 MG: 2 INJECTION, POWDER, FOR SOLUTION INTRAMUSCULAR; INTRAVENOUS at 19:57

## 2024-07-24 RX ADMIN — FENTANYL CITRATE 50 MCG: 50 INJECTION, SOLUTION INTRAMUSCULAR; INTRAVENOUS at 10:55

## 2024-07-24 RX ADMIN — ACETAMINOPHEN 325MG 650 MG: 325 TABLET ORAL at 16:13

## 2024-07-24 RX ADMIN — ROCURONIUM BROMIDE 30 MG: 10 INJECTION, SOLUTION INTRAVENOUS at 10:43

## 2024-07-24 RX ADMIN — ACETAMINOPHEN 325MG 650 MG: 325 TABLET ORAL at 22:10

## 2024-07-24 RX ADMIN — MIDAZOLAM HYDROCHLORIDE 2 MG: 1 INJECTION, SOLUTION INTRAMUSCULAR; INTRAVENOUS at 07:33

## 2024-07-24 RX ADMIN — SODIUM CHLORIDE, POTASSIUM CHLORIDE, SODIUM LACTATE AND CALCIUM CHLORIDE: 600; 310; 30; 20 INJECTION, SOLUTION INTRAVENOUS at 07:33

## 2024-07-24 RX ADMIN — DEXAMETHASONE SODIUM PHOSPHATE 10 MG: 10 INJECTION INTRAMUSCULAR; INTRAVENOUS at 07:53

## 2024-07-24 RX ADMIN — ROCURONIUM BROMIDE 60 MG: 10 INJECTION, SOLUTION INTRAVENOUS at 07:36

## 2024-07-24 RX ADMIN — CEFAZOLIN 2000 MG: 2 INJECTION, POWDER, FOR SOLUTION INTRAMUSCULAR; INTRAVENOUS at 12:03

## 2024-07-24 RX ADMIN — KETOROLAC TROMETHAMINE 30 MG: 30 INJECTION, SOLUTION INTRAMUSCULAR at 07:01

## 2024-07-24 RX ADMIN — SODIUM CHLORIDE: 9 INJECTION, SOLUTION INTRAVENOUS at 16:28

## 2024-07-24 RX ADMIN — FENTANYL CITRATE 50 MCG: 50 INJECTION, SOLUTION INTRAMUSCULAR; INTRAVENOUS at 07:36

## 2024-07-24 RX ADMIN — FENTANYL CITRATE 25 MCG: 50 INJECTION, SOLUTION INTRAMUSCULAR; INTRAVENOUS at 12:25

## 2024-07-24 RX ADMIN — FENTANYL CITRATE 25 MCG: 50 INJECTION, SOLUTION INTRAMUSCULAR; INTRAVENOUS at 12:14

## 2024-07-24 RX ADMIN — FENTANYL CITRATE 25 MCG: 50 INJECTION, SOLUTION INTRAMUSCULAR; INTRAVENOUS at 12:35

## 2024-07-24 RX ADMIN — HYDROMORPHONE HYDROCHLORIDE 0.5 MG: 1 INJECTION, SOLUTION INTRAMUSCULAR; INTRAVENOUS; SUBCUTANEOUS at 13:03

## 2024-07-24 ASSESSMENT — PAIN DESCRIPTION - LOCATION
LOCATION: BACK
LOCATION: SACRUM;OTHER (COMMENT)
LOCATION: BACK

## 2024-07-24 ASSESSMENT — PAIN DESCRIPTION - ORIENTATION
ORIENTATION: LOWER

## 2024-07-24 ASSESSMENT — PAIN SCALES - GENERAL
PAINLEVEL_OUTOF10: 5
PAINLEVEL_OUTOF10: 7
PAINLEVEL_OUTOF10: 4
PAINLEVEL_OUTOF10: 7
PAINLEVEL_OUTOF10: 6
PAINLEVEL_OUTOF10: 6
PAINLEVEL_OUTOF10: 7
PAINLEVEL_OUTOF10: 6

## 2024-07-24 ASSESSMENT — PAIN DESCRIPTION - DESCRIPTORS
DESCRIPTORS: BURNING
DESCRIPTORS: BURNING;THROBBING;SORE

## 2024-07-24 NOTE — CONSULTS
[facility] Hospitalist Consult Note    Admitting Date and Time: 7/24/2024  5:44 AM  Admit Dx: Other intervertebral disc displacement, lumbosacral region [M51.27]  Herniated nucleus pulposus, L5-S1, left [M51.27]    Subjective:  Patient is being followed for Other intervertebral disc displacement, lumbosacral region [M51.27]  Herniated nucleus pulposus, L5-S1, left [M51.27]   Pt is a 50 yo F with PMH  HTN, arthritis, sacroiliitis, anxiety, ADD, multiple sclerosis (not on home meds), seizure (pt reports last known seizure 4 yrs ago, does not take seizure medication). She was evaluated post operatively and other than post op pain had no acute complaints. We discussed her PMH and all of her current meds in detail.     ROS: denies fever, chills, cp, sob, n/v, HA unless stated above.      sodium chloride flush  5-40 mL IntraVENous 2 times per day    ceFAZolin (ANCEF) IVPB  2,000 mg IntraVENous Q8H    acetaminophen  650 mg Oral Q6H    bisacodyl  5 mg Oral Daily    ketorolac  30 mg IntraVENous Q6H     sodium chloride flush, 5-40 mL, PRN  sodium chloride, , PRN  oxyCODONE, 5 mg, Q6H PRN   Or  oxyCODONE, 5 mg, Q4H PRN  HYDROmorphone, 0.25 mg, Q3H PRN   Or  HYDROmorphone, 0.5 mg, Q3H PRN  ondansetron, 4 mg, Q6H PRN  magnesium hydroxide, 30 mL, Daily PRN  polyethylene glycol, 17 g, Daily PRN         Objective:    BP (!) 141/82   Pulse 81   Temp 98.6 °F (37 °C) (Oral)   Resp 16   Ht 1.651 m (5' 5\")   Wt 98.3 kg (216 lb 12.8 oz)   LMP 04/24/2024 (Approximate) Comment: hcg test negative  SpO2 96%   BMI 36.08 kg/m²     General Appearance: alert and oriented to person, place and time and in no acute distress  Skin: warm and dry  Head: normocephalic and atraumatic  Eyes: pupils equal, round, and reactive to light, extraocular eye movements intact, conjunctivae normal  Neck: neck supple and non tender without mass   Pulmonary/Chest: clear to auscultation bilaterally- no wheezes, rales or rhonchi, normal air movement, no

## 2024-07-24 NOTE — OP NOTE
[facility]                   [address] [place] [address]                            OPERATIVE REPORT      PATIENT NAME: ALIVIA MOSELEY           : 1975  MED REC NO: [id number]                        ROOM: [facility] OR Pool  ACCOUNT NO: [id number]                       ADMIT DATE: 2024  PROVIDER: Shira Tsai MD      DATE OF PROCEDURE:  2024    SURGEON:  Shira Tsai MD    PREOPERATIVE DIAGNOSIS:  Left L5-S1 recurrent extruded disk.    POSTOPERATIVE DIAGNOSIS:  Left L5-S1 recurrent extruded disk.    OPERATIONS PERFORMED:  Left L5-S1 lysis of scar, microdiskectomy.    INDICATIONS:  Severe back and left lower extremity pain.    DESCRIPTION OF PROCEDURE:  The patient was given general endotracheal anesthesia in supine position.  Turned to prone position.  The back was prepped and draped.  Time-out, patient identified.  Ancef IV preoperatively.  Needle was placed, lateral x-rays obtained to confirm level, needle was withdrawn.  The skin was infiltrated with lidocaine with epinephrine.  A skin incision was made over the tips of left-sided spinous processes, tips of L5-S1.  Dissection was carried down through the thick subcutaneous fatty tissue.  The patient is obese.  BMI 36.  The fascia was exposed from the previous and some of the previous scarring was identified.  The tip of the L5 and S1 spinous processes were identified.  Subperiosteal dissection was performed to expose the lamina of L5 and the superior lamina of S1 and the medial aspect of the facet joint complex.  Needle was placed.  Lateral x-rays obtained to confirm level.  Needle was withdrawn.  Micro retractor was placed.  Using high-speed bone dissector, a small laminectomy of the inferior aspect of L5 was performed.  Medial facetectomy of L5-S1 and the small laminectomy of the superior aspect of S1 were performed.  This was carried out to get into a more normal area.  I was able to then identify more  cartilaginous endplate.  Working medially, the medial extruded disk was also removed from the anterior aspect of the dural sac.  When all the extruded disk fragment was removed, I entered the disk space which was nearly collapsed.  The residual disk was removed to reduce the chances of recurrence.  Superiorly, I was able to identify with the blunt hook the inferior aspect of the L5 nerve root, which was completely free.  The wound was thoroughly irrigated.  The dural elements had been completely decompressed.  There was apparent damage to the origin of the S1 nerve root.  Distally, the S1 nerve root was swollen.  The wound was thoroughly irrigated.  Temporary use of Gelfoam was used.  FloSeal was placed.  Irrigations were dry.  Retractor removed.  Fascia closed with 0 Vicryl suture.    Subcutaneous tissues were closed with 2-0 and 3-0 Vicryl suture.  EBL, less than 50 mL.  No blood transfused.          MARISOL BERMUDEZ MD      D:  07/24/2024 12:06:31     T:  07/24/2024 12:40:39     ЕКАТЕРИНА/MARVIN  Job #:  [id number]     Doc#:  [id number]

## 2024-07-24 NOTE — DISCHARGE INSTR - COC
Continuity of Care Form    Patient Name: Elizabeth Moseley   :  1975  MRN:  [id number]    Admit date:  2024  Discharge date:  ***    Code Status Order: Full Code   Advance Directives:   Advance Care Flowsheet Documentation       Date/Time Healthcare Directive Type of Healthcare Directive Copy in Chart Healthcare Agent Appointed Healthcare Agent's Name Healthcare Agent's Phone Number    24 [phone] No, patient does not have an advance directive for healthcare treatment -- -- -- -- --            Admitting Physician:  Shira Tsai MD  PCP: Monica Moody MD    Discharging Nurse: ***  [place] [facility] [place]  Discharging Unit Phone Number: ***    Emergency Contact:   Extended Emergency Contact Information  Primary Emergency Contact: JAVED MOSELEY  Mobile Phone: [phone]  Relation: Parent    Past Surgical History:  Past Surgical History:   Procedure Laterality Date    CHOLECYSTECTOMY      COLONOSCOPY N/A 2023    COLORECTAL CANCER SCREENING, NOT HIGH RISK performed by Omid Chapa MD at [facility]    LUMBAR DISCECTOMY  2023    SEPTOPLASTY      VULVA SURGERY      abcess removal       Immunization History:   Immunization History   Administered Date(s) Administered    COVID-19, PFIZER PURPLE top, DILUTE for use, (age 12 y+), 30mcg/0.3mL 2021, 2022    Influenza Virus Vaccine 10/24/2014, 10/10/2016    Influenza, FLUARIX, FLULAVAL, FLUZONE (age 6 mo+) AND AFLURIA, (age 3 y+), PF, 0.5mL 10/29/2015, 2017, 2018, 2021    PPD Test 2017, 2018, 2018    Pneumococcal, PPSV23, PNEUMOVAX 23, (age 2y+), SC/IM, 0.5mL 10/11/2012    TDaP, ADACEL (age 10y-64y), BOOSTRIX (age 10y+), IM, 0.5mL 2014       Active Problems:  Patient Active Problem List   Diagnosis Code    Bilateral carpal tunnel syndrome G56.03    Polyp of colon K63.5    Herniated nucleus pulposus, L5-S1, left M51.27    Smoker F17.200    Sacroiliitis (HCC) M46.1

## 2024-07-24 NOTE — PROGRESS NOTES
Physical Therapy Med Surg Initial Assessment  Facility/Department: [address] ORTHO TELE  Room: [place] [address]       NAME: Elizabeth Stratton  : 1975 (49 y.o.)  MRN: [id number]  CODE STATUS: Full Code    Date of Service: 2024    Patient Diagnosis(es): Other intervertebral disc displacement, lumbosacral region [M51.27]  Herniated nucleus pulposus, L5-S1, left [M51.27]   No chief complaint on file.    Patient Active Problem List    Diagnosis Date Noted    Polyp of colon 2023    Bilateral carpal tunnel syndrome 2023    Other intervertebral disc displacement, lumbosacral region 2024    Sacroiliitis (HCC) 2024    ADD (attention deficit disorder) without hyperactivity 2024    Herniated nucleus pulposus, L5-S1, left 2023    Smoker 2023    Anxiety 07/10/2023    Seizure (HCC) 2020    Anemia 2018    Hyperlipidemia 2014    Multiple sclerosis (HCC) 2009      Past Medical History:   Diagnosis Date    Arthritis     Chronic back pain     Chronic headaches     Depression     H/O multiple sclerosis (HCC)     History of blood transfusion     Hyperlipidemia     Hypertension     Seizures (HCC)     last seizure 4 years ago.     Past Surgical History:   Procedure Laterality Date    CHOLECYSTECTOMY      COLONOSCOPY N/A 2023    COLORECTAL CANCER SCREENING, NOT HIGH RISK performed by Omid [facility]a MD at [facility]    LUMBAR DISCECTOMY  2023    SEPTOPLASTY      VULVA SURGERY      abcess removal   Chart Reviewed: Yes  Patient assessed for rehabilitation services?: Yes  Family / Caregiver Present: No  Restrictions:  Restrictions/Precautions: Up as Tolerated     SUBJECTIVE:   Subjective: Pt reports postop pain. agreeable to PT eval    Pain  Pre treatment screenin-6/10   Location: low back  Description: ache  Onset/duration: acute/post op  [x]  Pt declined intervention  [x]  Pt able to proceed PT session  []  RN or physician notified  []  RN called

## 2024-07-24 NOTE — ANESTHESIA POSTPROCEDURE EVALUATION
Department of Anesthesiology  Postprocedure Note    Patient: Elizabeth Stratton  MRN: [id number]  YOB: 1975  Date of evaluation: 7/24/2024    Procedure Summary       Date: 07/24/24 Room / Location: [address]    Anesthesia Start: 0733 Anesthesia Stop: 1238    Procedure: LEFT L5-S1 LYSIS OF SCARS; MICRODISKECTOMY (Left) Diagnosis:       Other intervertebral disc displacement, lumbosacral region      (Other intervertebral disc displacement, lumbosacral region [M51.27])    Surgeons: Shira Tsai MD Responsible Provider: Michelle Patton MD    Anesthesia Type: general ASA Status: 3            Anesthesia Type: No value filed.    Ofelia Phase I: Ofelia Score: 10    Ofelia Phase II:      Anesthesia Post Evaluation    Patient location during evaluation: PACU  Patient participation: complete - patient participated  Level of consciousness: awake  Pain score: 0  Airway patency: patent  Nausea & Vomiting: no nausea and no vomiting  Cardiovascular status: hemodynamically stable  Respiratory status: acceptable  Hydration status: euvolemic  Pain management: adequate        No notable events documented.

## 2024-07-24 NOTE — BRIEF OP NOTE
Brief Postoperative Note      Patient: Elizabeth Stratton  YOB: 1975  MRN: [id number]    Date of Procedure: 7/24/2024    Pre-Op Diagnosis Codes:     * Other intervertebral disc displacement, lumbosacral region [M51.27]    Post-Op Diagnosis: Same       Procedure(s):  LEFT L5-S1 LYSIS OF SCARS; MICRODISKECTOMY    Surgeon(s):  Shira Tsai MD    Assistant:  First Assistant: Lotus Alexander PA-C    Anesthesia: General    Estimated Blood Loss (mL): Minimal    Complications: None        Findings:  Infection Present At Time Of Surgery (PATOS) (choose all levels that have infection present):  No infection present  Other Findings: Lethargic extruded cartilaginous endplate disc embedded into the origin of the left S1 nerve  Electronically signed by SHIRA TSAI MD on 7/24/2024 at 11:58 AM

## 2024-07-24 NOTE — INTERVAL H&P NOTE
Update History & Physical    The patient's History and Physical of  was reviewed with the patient and I examined the patient. There was no change. The surgical site was confirmed by the patient and me.     Plan: The risks, benefits, expected outcome, and alternative to the recommended procedure have been discussed with the patient. Patient understands and wants to proceed with the procedure.     Electronically signed by MARISOL BERMUDEZ MD on 7/24/2024 at 7:21 AM

## 2024-07-24 NOTE — PLAN OF CARE
See OT evaluation for all goals and OT POC. Electronically signed by Beatriz Sweet OTR/L on 7/24/2024 at 4:21 PM

## 2024-07-24 NOTE — DISCHARGE INSTRUCTIONS
Every day after surgery change dressing frequently to keep wound clean and dry using 4X4 gauze pads and paper tape.    May shower in 3 days postoperative.    Do not soak or soap wound for one week post operative.    Discharge prescriptions e-prescribed to pharmacy.  Order done  as outpatient.    Obtain Xrays at [facility] few days prior to recheck appointment.  Order done as outpatient.    Recheck one month.    Questions call office .        [facility]  Outpatient Discharge Instructions    To continue your care at home, please follow the instructions below and any additional discharge instructions given to you by your physician.    GENERAL ANESTHESIA:  Do not drive or operate machinery for 24hrs after discharge,  Do not drink alcohol, take tranquilizers, sleeping medication, or any other medication not directly instructed by your physician,   Do not make any important decisions or sign any legal documents for 24hrs after surgery,  Have someone with you for 24hrs after surgery to assist you as needed.    ACTIVITY:  Light activity for 24hrs,  No heavy lifting or exercise until instructed by your physician,  You may resume normal activities once instructed by your physician,  Special Instruction: ___________________________________________________________________    FLUIDS AND DIET:  An upset stomach or feeling sick (nausea) can commonly occur after surgery and/or pain medication use. To help minimize nausea:  Do not eat a heavy meal soon after your surgery,    Start with water or other clear liquids,  Advance to mild or bland items like Jell-O, dry toast, crackers, etc.,  Avoid caffeine,  Do not drink alcohol for at least 24 hours after surgery,  Your physician may prescribe anti-nausea medication if your nausea continues,  If you are free from nausea for 24hrs, you can advance to your normal diet as tolerated.    OPERATIVE SITE:  A small amount of bleeding or drainage after surgery is normal. Your

## 2024-07-24 NOTE — PROGRESS NOTES
[facility] OCCUPATIONAL THERAPY EVALUATION - ACUTE     NAME: Elizabeth Stratton  : 1975 (49 y.o.)  MRN: [id number]  CODE STATUS: Full Code  Room: W266/W266-01    Date of Service: 2024    Patient Diagnosis(es): Other intervertebral disc displacement, lumbosacral region [M51.27]  Herniated nucleus pulposus, L5-S1, left [M51.27]   Patient Active Problem List    Diagnosis Date Noted    Polyp of colon 2023    Bilateral carpal tunnel syndrome 2023    Other intervertebral disc displacement, lumbosacral region 2024    Sacroiliitis (HCC) 2024    ADD (attention deficit disorder) without hyperactivity 2024    Herniated nucleus pulposus, L5-S1, left 2023    Smoker 2023    Anxiety 07/10/2023    Seizure (HCC) 2020    Anemia 2018    Hyperlipidemia 2014    Multiple sclerosis (HCC) 2009        Past Medical History:   Diagnosis Date    Arthritis     Chronic back pain     Chronic headaches     Depression     H/O multiple sclerosis (HCC)     History of blood transfusion     Hyperlipidemia     Hypertension     Seizures (HCC)     last seizure 4 years ago.     Past Surgical History:   Procedure Laterality Date    CHOLECYSTECTOMY      COLONOSCOPY N/A 2023    COLORECTAL CANCER SCREENING, NOT HIGH RISK performed by O[facility]a MD at [facility]    LUMBAR DISCECTOMY  2023    SEPTOPLASTY      VULVA SURGERY      abcess removal        Restrictions  Restrictions/Precautions: Up as Tolerated     Safety Devices: Safety Devices  Type of Devices: All fall risk precautions in place;Call light within reach;Left in bed;Bed alarm in place     Patient's date of birth confirmed: Yes    General:  Chart Reviewed: Yes  Patient assessed for rehabilitation services?: Yes    Subjective  Subjective: \"My back is burning\"       Pain at start of treatment: Yes: 6/10    Pain at end of treatment: Yes: 6/10    Location: Low back  Description: Burning  Nursing

## 2024-07-24 NOTE — CARE COORDINATION
Case Management Assessment  Initial Evaluation    Date/Time of Evaluation: 7/24/2024 2:47 PM  Assessment Completed by: Cecelia Orosco RN    If patient is discharged prior to next notation, then this note serves as note for discharge by case management.    Patient Name: Elizabeth Stratton                   YOB: 1975  Diagnosis: Other intervertebral disc displacement, lumbosacral region [M51.27]  Herniated nucleus pulposus, L5-S1, left [M51.27]                   Date / Time: 7/24/2024  5:44 AM    Patient Admission Status: Observation   Readmission Risk (Low < 19, Mod (19-27), High > 27): No data recorded  Current PCP: Monica Moody MD  PCP verified by ? Yes    Chart Reviewed: Yes      History Provided by: Patient  Patient Orientation: Alert and Oriented, Person, Place, Situation, Self    Patient Cognition: Alert    Hospitalization in the last 30 days (Readmission):  No    If yes, Readmission Assessment in  Navigator will be completed.    Advance Directives:      Code Status: Full Code   Patient's Primary Decision Maker is: Legal Next of Kin    Primary Decision Maker: JAVED STRATTON - Parent - [phone]    Discharge Planning:    Patient lives with:   Type of Home:    Primary Care Giver: Self  Patient Support Systems include: Children, Family Members   Current Financial resources:    Current community resources:    Current services prior to admission:              Current DME:              Type of Home Care services:       ADLS  Prior functional level: Independent in ADLs/IADLs  Current functional level: Independent in ADLs/IADLs    PT AM-PAC:   /24  OT AM-PAC:   /24    Family can provide assistance at DC: Yes  Would you like Case Management to discuss the discharge plan with any other family members/significant others, and if so, who? No  Plans to Return to Present Housing: Yes  Other Identified Issues/Barriers to RETURNING to current housing: NO  Potential Assistance needed at discharge:

## 2024-07-25 PROCEDURE — 6360000002 HC RX W HCPCS: Performed by: NEUROLOGICAL SURGERY

## 2024-07-25 PROCEDURE — 6370000000 HC RX 637 (ALT 250 FOR IP): Performed by: NEUROLOGICAL SURGERY

## 2024-07-25 PROCEDURE — G0378 HOSPITAL OBSERVATION PER HR: HCPCS

## 2024-07-25 PROCEDURE — 2580000003 HC RX 258: Performed by: NEUROLOGICAL SURGERY

## 2024-07-25 PROCEDURE — 96376 TX/PRO/DX INJ SAME DRUG ADON: CPT

## 2024-07-25 PROCEDURE — 97116 GAIT TRAINING THERAPY: CPT

## 2024-07-25 PROCEDURE — 6370000000 HC RX 637 (ALT 250 FOR IP): Performed by: STUDENT IN AN ORGANIZED HEALTH CARE EDUCATION/TRAINING PROGRAM

## 2024-07-25 PROCEDURE — 96375 TX/PRO/DX INJ NEW DRUG ADDON: CPT

## 2024-07-25 PROCEDURE — 96374 THER/PROPH/DIAG INJ IV PUSH: CPT

## 2024-07-25 RX ADMIN — ACETAMINOPHEN 325MG 650 MG: 325 TABLET ORAL at 08:28

## 2024-07-25 RX ADMIN — Medication 10 ML: at 08:31

## 2024-07-25 RX ADMIN — ACETAMINOPHEN 325MG 650 MG: 325 TABLET ORAL at 20:23

## 2024-07-25 RX ADMIN — Medication 10 ML: at 20:25

## 2024-07-25 RX ADMIN — KETOROLAC TROMETHAMINE 30 MG: 30 INJECTION INTRAMUSCULAR; INTRAVENOUS at 03:42

## 2024-07-25 RX ADMIN — ACETAMINOPHEN 325MG 650 MG: 325 TABLET ORAL at 15:37

## 2024-07-25 RX ADMIN — OXYCODONE HYDROCHLORIDE 5 MG: 5 TABLET ORAL at 15:37

## 2024-07-25 RX ADMIN — KETOROLAC TROMETHAMINE 30 MG: 30 INJECTION INTRAMUSCULAR; INTRAVENOUS at 08:28

## 2024-07-25 RX ADMIN — CEFAZOLIN 2000 MG: 2 INJECTION, POWDER, FOR SOLUTION INTRAMUSCULAR; INTRAVENOUS at 03:44

## 2024-07-25 RX ADMIN — OXYCODONE HYDROCHLORIDE 5 MG: 5 TABLET ORAL at 08:28

## 2024-07-25 RX ADMIN — HYDROMORPHONE HYDROCHLORIDE 0.5 MG: 1 INJECTION, SOLUTION INTRAMUSCULAR; INTRAVENOUS; SUBCUTANEOUS at 18:41

## 2024-07-25 RX ADMIN — OXYCODONE HYDROCHLORIDE 5 MG: 5 TABLET ORAL at 11:37

## 2024-07-25 RX ADMIN — ACETAMINOPHEN 325MG 650 MG: 325 TABLET ORAL at 03:42

## 2024-07-25 RX ADMIN — KETOROLAC TROMETHAMINE 30 MG: 30 INJECTION INTRAMUSCULAR; INTRAVENOUS at 20:24

## 2024-07-25 RX ADMIN — KETOROLAC TROMETHAMINE 30 MG: 30 INJECTION INTRAMUSCULAR; INTRAVENOUS at 15:37

## 2024-07-25 RX ADMIN — HYDROMORPHONE HYDROCHLORIDE 0.5 MG: 1 INJECTION, SOLUTION INTRAMUSCULAR; INTRAVENOUS; SUBCUTANEOUS at 03:40

## 2024-07-25 RX ADMIN — DOXEPIN HYDROCHLORIDE 100 MG: 50 CAPSULE ORAL at 20:24

## 2024-07-25 RX ADMIN — OXYCODONE HYDROCHLORIDE 5 MG: 5 TABLET ORAL at 21:24

## 2024-07-25 ASSESSMENT — PAIN DESCRIPTION - DESCRIPTORS
DESCRIPTORS: THROBBING
DESCRIPTORS: ACHING

## 2024-07-25 ASSESSMENT — PAIN SCALES - GENERAL
PAINLEVEL_OUTOF10: 5
PAINLEVEL_OUTOF10: 6
PAINLEVEL_OUTOF10: 8
PAINLEVEL_OUTOF10: 6
PAINLEVEL_OUTOF10: 7
PAINLEVEL_OUTOF10: 8
PAINLEVEL_OUTOF10: 7

## 2024-07-25 ASSESSMENT — PAIN DESCRIPTION - ORIENTATION
ORIENTATION: LEFT;RIGHT
ORIENTATION: RIGHT;LEFT

## 2024-07-25 ASSESSMENT — PAIN DESCRIPTION - LOCATION
LOCATION: LEG;BACK
LOCATION: LEG
LOCATION: BACK

## 2024-07-25 NOTE — CARE COORDINATION
Met with pt and family at bedside. Dc plan remains home and pt denies needs of HHC as recommended by therapy. Pt has support from daughter and family. Pt denies need of walker when offered.

## 2024-07-25 NOTE — PLAN OF CARE
Problem: Discharge Planning  Goal: Discharge to home or other facility with appropriate resources  Outcome: Progressing     Problem: Pain  Goal: Verbalizes/displays adequate comfort level or baseline comfort level  Outcome: Progressing     Problem: Occupational Therapy - Adult  Goal: By Discharge: Performs self-care activities at highest level of function for planned discharge setting.  See evaluation for individualized goals.  7/24/2024 1621 by Beatriz Sweet, OTR/L  Outcome: Progressing     Problem: Skin/Tissue Integrity  Goal: Absence of new skin breakdown  Description: 1.  Monitor for areas of redness and/or skin breakdown  2.  Assess vascular access sites hourly  3.  Every 4-6 hours minimum:  Change oxygen saturation probe site  4.  Every 4-6 hours:  If on nasal continuous positive airway pressure, respiratory therapy assess nares and determine need for appliance change or resting period.  Outcome: Progressing     Problem: Safety - Adult  Goal: Free from fall injury  Outcome: Progressing     Problem: ABCDS Injury Assessment  Goal: Absence of physical injury  Outcome: Progressing

## 2024-07-25 NOTE — PROGRESS NOTES
Physical Therapy Med Surg Daily Treatment Note  Facility/Department: [facility] 2W ORTHO TELE  [facility]       NAME: Elizabeth Stratton  : 1975 (49 y.o.)  MRN: [id number]  CODE STATUS: Full Code    Date of Service: 2024    Patient Diagnosis(es): Other intervertebral disc displacement, lumbosacral region [M51.27]  Herniated nucleus pulposus, L5-S1, left [M51.27]   No chief complaint on file.    Patient Active Problem List    Diagnosis Date Noted    Polyp of colon 2023    Bilateral carpal tunnel syndrome 2023    Other intervertebral disc displacement, lumbosacral region 2024    Sacroiliitis (HCC) 2024    ADD (attention deficit disorder) without hyperactivity 2024    Herniated nucleus pulposus, L5-S1, left 2023    Smoker 2023    Anxiety 07/10/2023    Seizure (HCC) 2020    Anemia 2018    Hyperlipidemia 2014    Multiple sclerosis (HCC) 2009        Past Medical History:   Diagnosis Date    Arthritis     Chronic back pain     Chronic headaches     Depression     H/O multiple sclerosis (HCC)     History of blood transfusion     Hyperlipidemia     Hypertension     Seizures (HCC)     last seizure 4 years ago.     Past Surgical History:   Procedure Laterality Date    CHOLECYSTECTOMY      COLONOSCOPY N/A 2023    COLORECTAL CANCER SCREENING, NOT HIGH RISK performed by Omid Chapa MD at [facility]    LAMINECTOMY Left 2024    LEFT L5-S1 LYSIS OF SCARS; MICRODISKECTOMY performed by Shira Tsai MD at [facility] OR    LUMBAR DISCECTOMY  2023    SEPTOPLASTY      VULVA SURGERY      abcess removal            Restrictions:  Restrictions/Precautions: Up as Tolerated    SUBJECTIVE:   Subjective: my left heel is numb.    Pain  Pain: Pre and post session pain 5/10: declined intervention.    OBJECTIVE:        Bed mobility  Rolling to Left: Modified independent  Rolling to Right: Modified independent  Supine to Sit: Modified  more of the activity; assistance is required to complete the activity  Moderate assistance= pt performs 50% of the activity; assistance is required to complete the activity  Maximal assistance = pt performs 25% of the activity; assistance is required to complete the activity  Dependent = pt requires total physical assistance to accomplish the task

## 2024-07-25 NOTE — PROGRESS NOTES
Postoperative day #1.  Patient has severe bilateral lower extremity pain which has improved postoperatively.  Radicular pain left greater than right.  Wound is healing well.  Starting to ambulate with assistance.  Bladder bowel control intact.    Discussed with the patient answered all her questions about the surgical procedure.  Visual evidence of permanent damage to the origin of the S1 nerve root at the time of surgery.  Discussed the progression of osteoarthritis degenerative disc disease.  All questions answered

## 2024-07-26 VITALS
WEIGHT: 216.8 LBS | RESPIRATION RATE: 18 BRPM | DIASTOLIC BLOOD PRESSURE: 80 MMHG | SYSTOLIC BLOOD PRESSURE: 137 MMHG | OXYGEN SATURATION: 100 % | TEMPERATURE: 97.5 F | HEIGHT: 65 IN | HEART RATE: 86 BPM | BODY MASS INDEX: 36.12 KG/M2

## 2024-07-26 PROCEDURE — 6360000002 HC RX W HCPCS: Performed by: NEUROLOGICAL SURGERY

## 2024-07-26 PROCEDURE — 6370000000 HC RX 637 (ALT 250 FOR IP): Performed by: NEUROLOGICAL SURGERY

## 2024-07-26 PROCEDURE — 2580000003 HC RX 258: Performed by: NEUROLOGICAL SURGERY

## 2024-07-26 PROCEDURE — G0378 HOSPITAL OBSERVATION PER HR: HCPCS

## 2024-07-26 PROCEDURE — 96376 TX/PRO/DX INJ SAME DRUG ADON: CPT

## 2024-07-26 RX ADMIN — KETOROLAC TROMETHAMINE 30 MG: 30 INJECTION INTRAMUSCULAR; INTRAVENOUS at 02:19

## 2024-07-26 RX ADMIN — ACETAMINOPHEN 325MG 650 MG: 325 TABLET ORAL at 02:19

## 2024-07-26 RX ADMIN — BISACODYL 5 MG: 5 TABLET, COATED ORAL at 08:42

## 2024-07-26 RX ADMIN — KETOROLAC TROMETHAMINE 30 MG: 30 INJECTION INTRAMUSCULAR; INTRAVENOUS at 08:43

## 2024-07-26 RX ADMIN — Medication 10 ML: at 08:44

## 2024-07-26 RX ADMIN — ACETAMINOPHEN 325MG 650 MG: 325 TABLET ORAL at 08:42

## 2024-07-26 RX ADMIN — OXYCODONE HYDROCHLORIDE 5 MG: 5 TABLET ORAL at 01:11

## 2024-07-26 ASSESSMENT — PAIN DESCRIPTION - DESCRIPTORS
DESCRIPTORS: ACHING
DESCRIPTORS: ACHING

## 2024-07-26 ASSESSMENT — PAIN SCALES - GENERAL
PAINLEVEL_OUTOF10: 6
PAINLEVEL_OUTOF10: 4

## 2024-07-26 ASSESSMENT — PAIN DESCRIPTION - LOCATION
LOCATION: BACK
LOCATION: LEG;BACK
LOCATION: BACK

## 2024-07-26 ASSESSMENT — PAIN DESCRIPTION - ORIENTATION: ORIENTATION: RIGHT;LEFT

## 2024-07-26 NOTE — CONSULTS
[facility] Hospitalist Consult Note    Admitting Date and Time: 7/24/2024  5:44 AM  Admit Dx: Other intervertebral disc displacement, lumbosacral region [M51.27]  Herniated nucleus pulposus, L5-S1, left [M51.27]    Subjective:  Patient is being followed for Other intervertebral disc displacement, lumbosacral region [M51.27]  Herniated nucleus pulposus, L5-S1, left [M51.27]   Pt is a 50 yo F with PMH  HTN, arthritis, sacroiliitis, anxiety, ADD, multiple sclerosis (not on home meds), seizure (pt reports last known seizure 4 yrs ago, does not take seizure medication). She had no acute complaints during my discussion this morning.              Objective:    /80   Pulse 86   Temp 97.5 °F (36.4 °C) (Oral)   Resp 18   Ht 1.651 m (5' 5\")   Wt 98.3 kg (216 lb 12.8 oz)   LMP 04/24/2024 (Approximate) Comment: hcg test negative  SpO2 100%   BMI 36.08 kg/m²     General Appearance: alert and oriented to person, place and time and in no acute distress  Skin: warm and dry  Head: normocephalic and atraumatic  Eyes: pupils equal, round, and reactive to light, extraocular eye movements intact, conjunctivae normal  Neck: neck supple and non tender without mass   Pulmonary/Chest: clear to auscultation bilaterally- no wheezes, rales or rhonchi, normal air movement, no respiratory distress  Cardiovascular: normal rate, normal S1 and S2 and no carotid bruits  Abdomen: soft, non-tender, non-distended, normal bowel sounds, no masses or organomegaly  Extremities: no cyanosis, no clubbing and no edema  Neurologic: no cranial nerve deficit and speech normal        No results for input(s): \"NA\", \"K\", \"CL\", \"CO2\", \"BUN\", \"CREATININE\", \"GLUCOSE\", \"CALCIUM\" in the last 72 hours.    No results for input(s): \"WBC\", \"RBC\", \"HGB\", \"HCT\", \"MCV\", \"MCH\", \"MCHC\", \"RDW\", \"PLT\", \"MPV\" in the last 72 hours.    Radiology:   [facility]O FOR SURGICAL PROCEDURES   Final Result   Intraprocedural fluoroscopic spot images as above.  See separate procedure

## 2024-07-26 NOTE — PLAN OF CARE
Problem: Discharge Planning  Goal: Discharge to home or other facility with appropriate resources  7/26/2024 0958 by Julia Escoto, RN  Outcome: Progressing       Problem: Pain  Goal: Verbalizes/displays adequate comfort level or baseline comfort level  7/26/2024 0958 by Julia Escoto, RN  Outcome: Progressing       Problem: Skin/Tissue Integrity  Goal: Absence of new skin breakdown  Description: 1.  Monitor for areas of redness and/or skin breakdown  2.  Assess vascular access sites hourly  3.  Every 4-6 hours minimum:  Change oxygen saturation probe site  4.  Every 4-6 hours:  If on nasal continuous positive airway pressure, respiratory therapy assess nares and determine need for appliance change or resting period.  7/26/2024 0958 by Julia Escoto, RN  Outcome: Progressing    Problem: Safety - Adult  Goal: Free from fall injury  7/26/2024 0958 by Julia Escoto, RN  Outcome: Progressing  ng     Problem: ABCDS Injury Assessment  Goal: Absence of physical injury  7/26/2024 0958 by Julia Escoto, RN  Outcome: Progressing

## 2024-07-26 NOTE — DISCHARGE SUMMARY
Discharge Summary    Date:7/26/2024        Patient Name:Elizabeth Stratton     YOB: 1975     Age:49 y.o.    Admit Date:7/24/2024   Admission Condition:fair   Discharged Condition:good  Discharge Date: 07/26/24     Discharge Diagnoses   Principal Problem:    Herniated nucleus pulposus, L5-S1, left  Resolved Problems:    * No resolved hospital problems. *      Hospital Stay   Narrative of Hospital Course: 7/24/2020 for left L5-S1 lysis of scar microdiscectomy.  Surgical findings include damage to origin of the S1 nerve root on the left from embedded disc into the dura.  Postoperative patient has slight improvement of her severe bilateral lower extremity pain has good strength sensation both lower extremities.  Chronic pain left lower extremity starting to improve.  Plan discharge to home.  Hospitalist Dr. Briggs most helpful for evaluation treatment of medical comorbidities.        Consultants:   IP CONSULT TO HOSPITALIST    Time Spent on Discharge:  15 minutes were spent in patient examination, evaluation, counseling as well as medication reconciliation, prescriptions for required medications, discharge plan and follow up.      Surgeries/Procedures Performed:  Procedure(s):  LEFT L5-S1 LYSIS OF SCARS; MICRODISKECTOMY         Significant Diagnostic Studies:   Recent Labs:  CBC:   Lab Results   Component Value Date/Time    WBC 6.4 07/17/2024 12:06 PM    RBC 4.37 07/17/2024 12:06 PM    HGB 11.2 07/17/2024 12:06 PM    HCT 35.8 07/17/2024 12:06 PM    MCV 81.9 07/17/2024 12:06 PM    MCH 25.6 07/17/2024 12:06 PM    MCHC 31.3 07/17/2024 12:06 PM    RDW 17.4 07/17/2024 12:06 PM     07/17/2024 12:06 PM     CMP:    Lab Results   Component Value Date/Time    GLUCOSE 115 07/17/2024 12:06 PM     07/17/2024 12:06 PM    K 3.6 07/17/2024 12:06 PM     07/17/2024 12:06 PM    CO2 27 07/17/2024 12:06 PM    BUN 11 07/17/2024 12:06 PM    CREATININE 0.73 07/17/2024 12:06 PM    ANIONGAP 9 07/17/2024 12:06 PM

## 2024-07-29 ENCOUNTER — TELEPHONE (OUTPATIENT)
Dept: PRIMARY CARE CLINIC | Age: 49
End: 2024-07-29

## 2024-07-29 NOTE — TELEPHONE ENCOUNTER
Care Transitions Initial Follow Up Call    Outreach made within 2 business days of discharge: Yes    Patient: Elizabeth Stratton Patient : 1975   MRN: [id number]  Reason for Admission: Herniated nucleus pulposus, L5-S1, left   Discharge Date: 24       Spoke with: LMOM X    Discharge department/facility: [place]    TCM Interactive Patient Contact:    No needs identified             Scheduled appointment with PCP within 7-14 days    Follow Up  Future Appointments   Date Time Provider Department Center   8/15/2024  9:15 AM Shira Tsai MD [facility] [place], [place]

## 2024-07-29 NOTE — TELEPHONE ENCOUNTER
Care Transitions Initial Follow Up Call    Outreach made within 2 business days of discharge: Yes    Patient: Elizabeth Stratton Patient : 1975   MRN: [id number]  Reason for Admission: Herniated nucleus pulposus, L5-S1, left   Discharge Date: 24       Spoke with: ANJELICA X1    Discharge department/facility: [place]    TCM Interactive Patient Contact:      Scheduled appointment with PCP within 7-14 days    Follow Up  Future Appointments   Date Time Provider Department Center   8/15/2024  9:15 AM Shira Tsai MD [facility] [place], [place]

## 2024-07-30 NOTE — TELEPHONE ENCOUNTER
Care Transitions Initial Follow Up Call    Outreach made within 2 business days of discharge: Yes    Patient: Elizabeth Stratton Patient : 1975   MRN: [id number]  Reason for Admission:  Herniated nucleus pulposus, L5-S1, left   Discharge Date: 24       Spoke with: PT     Discharge department/facility: [place]    TCM Interactive Patient Contact:  Was patient able to fill all prescriptions: Yes  Was patient instructed to bring all medications to the follow-up visit: Yes  Is patient taking all medications as directed in the discharge summary? Yes  Does patient understand their discharge instructions: Yes  Does patient have questions or concerns that need addressed prior to 7-14 day follow up office visit: no    Additional needs identified to be addressed with provider  No needs identified             Scheduled appointment with PCP within 7-14 days  PT REFUSED HFV  Follow Up  Future Appointments   Date Time Provider Department Center   8/15/2024  9:15 AM Shira Tsai MD [facility] [place], [place]

## 2024-07-30 NOTE — TELEPHONE ENCOUNTER
Care Transitions Initial Follow Up Call    Outreach made within 2 business days of discharge: Yes    Patient: Elizabeth Stratton Patient : 1975   MRN: [id number]  Reason for Admission:  Herniated nucleus pulposus, L5-S1, left   Discharge Date: 24       Spoke with: LMJORGE x1    Discharge department/facility: [place]    TCM Interactive Patient Contact:    Additional needs identified to be addressed with provider  No needs identified             Scheduled appointment with PCP within 7-14 days    Follow Up  Future Appointments   Date Time Provider Department Center   8/15/2024  9:15 AM Shira Tsai MD [facility] [place]

## 2024-08-14 ENCOUNTER — HOSPITAL ENCOUNTER (OUTPATIENT)
Dept: PHYSICAL THERAPY | Age: 49
Setting detail: THERAPIES SERIES
Discharge: HOME | End: 2024-08-14
Payer: COMMERCIAL

## 2024-08-14 PROCEDURE — 97162 PT EVAL MOD COMPLEX 30 MIN: CPT

## 2024-08-14 NOTE — THERAPY EVALUATION
Body Structures, Functions, Activity Limitations Requiring Skilled Therapeutic Intervention: Decreased ROM, Decreased functional mobility , Decreased body mechanics, Decreased tolerance to work activity, Decreased strength, Decreased posture, Increased pain  Patient is a 48 yo who presents to skilled PT for radiating LBP s/p surgical procedure in July. Patient evaluation limited secondary to pain and radicular symptoms. Patient guarded and frequently readjusting off L side during evaluation. Patient with limited Wbing on L during standing and ambulation. Patient with limitations in strength and ROM. Due to current presentation patient is limited in mobility, home and work tasks. Skilled PT indicated to improve stated deficits, reduce pain and increase QOL to return to normal functional activities.    Outcome Measure: DEANGELO 36/50    Evaluation Complexity:   Decision Making Medium  History High  Exam Medium  Clinical Presentation Medium    Barriers to this patient's plan of care or recovery include: Pain tolerance/management     Goals  Patient Goal(s): Patient Goals : better mobility    Long Term Goals Completed by 6 weeks Goal Status   LTG 1 Patient will demonstrate >/=4+/5 in rex LE to improve WBing tolerance and overall functional mobility. New   LTG 2 Patient will report being able to tolerate static standing >/=20 min to improve functional house tasks such as dressing and dishes. New   LTG 3 Patient will improve lumbar ROM by >/= 25% to improve ADLs and demonstrate increased flexibility in hip musculature. New   LTG 4 Patient will report avg pain of </= 4/10 during functional tasks to demonstrate improvements in pain tolerance and QOL. New   LTG 5 Patient will improve 5xSTS to </=15 sec with equal WBing and no UE support to demonstrate improvements in transfers and demonstrate increased functional strength. New   LTG 6 Patient will improve DEANGELO to </=25/50 for improvements in subjective function, pain and QOL.

## 2024-08-14 NOTE — PROGRESS NOTES
Patient Name: Elizabeth Stratton : 1975        Date: 8/15/2024      Type of Appt: Post Op    Reason for appt: 24 LEFT L5-S1 LYSIS OF SCAR MICRODISKECTOMY.     Pt last seen by Dr Tsai on 3/28/24    Studies done: NO NEW STUDIES     Surgeries: 24 LEFT L5-S1 LYSIS OF SCAR MICRODISKECTOMY.          [facility]  Neurosurgery and Pain Management Center  [address] [place] [address] , [address]  [place], [place]  P: [phone]  F: [phone]      Patient: Elizabeth Stratton  YOB: 1975  Date: 8/15/2024    The patient is a 49 y.o. female who presents today for follow up.    Pain is better than she was before the surgery.  The left leg is good occasionally gets a zinger of pain into the left foot where it has been permanently numb especially on the outside of top of the left foot.  The right leg is okay.  Most of the pain is now in the back and not into the left leg.  If she moves too much during the day 6 out of 10 in the back.  Wound is well-healed.  Slight limp to the left when walking.  Briefly can support weight on heels and toes but causes a zinger pain into the left foot.  Objective numbness lateral left foot compared to right.    Sees Dr. Hines of neurology using gabapentin which helps somewhat.  Instructed her to continue her exercise increasing her activities as tolerated.  She has any questions or problems she will contact the office.  Physical therapy was discussed and is optional    MARISOL TSAI MD

## 2024-08-15 ENCOUNTER — OFFICE VISIT (OUTPATIENT)
Age: 49
End: 2024-08-15

## 2024-08-15 VITALS
TEMPERATURE: 97.2 F | DIASTOLIC BLOOD PRESSURE: 84 MMHG | HEIGHT: 65 IN | BODY MASS INDEX: 35.99 KG/M2 | WEIGHT: 216 LBS | SYSTOLIC BLOOD PRESSURE: 126 MMHG

## 2024-08-15 DIAGNOSIS — M54.16 LEFT LUMBAR RADICULOPATHY: Primary | ICD-10-CM

## 2024-08-15 DIAGNOSIS — M51.26 LUMBAR DISC HERNIATION: ICD-10-CM

## 2024-08-15 PROCEDURE — 99024 POSTOP FOLLOW-UP VISIT: CPT | Performed by: NEUROLOGICAL SURGERY

## 2024-08-21 ENCOUNTER — HOSPITAL ENCOUNTER (OUTPATIENT)
Dept: PHYSICAL THERAPY | Age: 49
Setting detail: THERAPIES SERIES
Discharge: HOME | End: 2024-08-21
Payer: COMMERCIAL

## 2024-08-21 PROCEDURE — 97110 THERAPEUTIC EXERCISES: CPT

## 2024-08-21 PROCEDURE — G0283 ELEC STIM OTHER THAN WOUND: HCPCS

## 2024-08-21 ASSESSMENT — PAIN DESCRIPTION - ORIENTATION: ORIENTATION: LEFT

## 2024-08-21 ASSESSMENT — PAIN DESCRIPTION - LOCATION: LOCATION: BACK;FOOT;LEG

## 2024-08-21 ASSESSMENT — PAIN SCALES - GENERAL: PAINLEVEL_OUTOF10: 6

## 2024-08-21 ASSESSMENT — PAIN DESCRIPTION - DESCRIPTORS: DESCRIPTORS: STABBING;ACHING

## 2024-08-21 NOTE — PROGRESS NOTES
[facility]  Outpatient Physical Therapy    Treatment Note        Date: 2024  Patient: Elizabeth Stratton  : 1975   Confirmed: Yes  MRN: [id number]  Referring Provider: Yash Hines MD    Medical Diagnosis: Radiculopathy, lumbar region [M54.16]       Treatment Diagnosis: LBP, radiating pain, weakness, decreased mobility    Visit Information:  Insurance: Payor: [facility] / Plan: [facility] [place] MEDICAID / Product Type: *No Product type* /   PT Visit Information  Total # of Visits Approved: 1  Total # of Visits to Date: 2  Plan of Care/Certification Expiration Date: 10/04/24  No Show: 0  Canceled Appointment: 0  Progress Note Counter:  (4 units until 10/4/24)    Subjective Information:  Subjective: Pt reports she has stabbing pain in her left foot. States her pain increases with extended standing as well as walking.  HEP Compliance:  [x] Good [] Fair [] Poor [] Reports not doing due to:               Pain Screening  Patient Currently in Pain: Yes  Pain Assessment: 0-10  Pain Level: 6  Pain Location: Back, Foot, Leg  Pain Orientation: Left  Pain Descriptors: Stabbing, Aching    Treatment:  Exercises:  Exercises  Exercise 1: 3 way pball roll outs 3\" hold x5 ea  Exercise 2: Seated: hip add ball squeezes, hip abd RTB 3\" x10 ea , marching RTB x10  Exercise 4: 2 way SLR x5 ea (limited ROM and increased pain) - requires wedge for supine  Exercise 5: seated hamstring stretch 20\" x3 BLE  Exercise 8: scifit L1 x5 min for mobility  Exercise 9: trialed bridges - to much pain  Exercise 10: clamshells / rev clams (increased pain) x10 ea b/l  Exercise 20: HEP: seated marching, hip abd, add, clamshells / rev clams - RTB provided         Modalities:  Cryotherapy (CPT 42539)  Patient Position: Seated  Cryotherapy location: Low back  Post treatment skin assessment: Intact  Limitations addressed: Pain modulation  Functional ability(s) targeted: Ambulating community distances, Performing self care

## 2024-08-28 ENCOUNTER — HOSPITAL ENCOUNTER (OUTPATIENT)
Dept: PHYSICAL THERAPY | Age: 49
Setting detail: THERAPIES SERIES
Discharge: HOME | End: 2024-08-28
Payer: COMMERCIAL

## 2024-08-28 ASSESSMENT — PAIN DESCRIPTION - PAIN TYPE: TYPE: CHRONIC PAIN

## 2024-08-28 ASSESSMENT — PAIN DESCRIPTION - LOCATION: LOCATION: BACK;KNEE;LEG

## 2024-08-28 ASSESSMENT — PAIN DESCRIPTION - DESCRIPTORS: DESCRIPTORS: ACHING;SHARP;SHOOTING;BURNING

## 2024-08-28 ASSESSMENT — PAIN SCALES - GENERAL: PAINLEVEL_OUTOF10: 5

## 2024-08-28 ASSESSMENT — PAIN DESCRIPTION - ORIENTATION: ORIENTATION: LEFT

## 2024-09-04 ENCOUNTER — HOSPITAL ENCOUNTER (OUTPATIENT)
Dept: PHYSICAL THERAPY | Age: 49
Setting detail: THERAPIES SERIES
Discharge: HOME | End: 2024-09-04
Payer: COMMERCIAL

## 2024-09-04 PROCEDURE — 97110 THERAPEUTIC EXERCISES: CPT

## 2024-09-04 PROCEDURE — 97140 MANUAL THERAPY 1/> REGIONS: CPT

## 2024-09-04 PROCEDURE — G0283 ELEC STIM OTHER THAN WOUND: HCPCS

## 2024-09-04 ASSESSMENT — PAIN DESCRIPTION - LOCATION: LOCATION: GENERALIZED

## 2024-09-04 ASSESSMENT — PAIN SCALES - GENERAL: PAINLEVEL_OUTOF10: 6

## 2024-09-04 ASSESSMENT — PAIN DESCRIPTION - ORIENTATION: ORIENTATION: LEFT

## 2024-09-04 ASSESSMENT — PAIN DESCRIPTION - DESCRIPTORS: DESCRIPTORS: ACHING

## 2024-09-04 NOTE — PROGRESS NOTES
[facility]  Outpatient Physical Therapy    Treatment Note        Date: 2024  Patient: Elizabeth Stratton  : 1975   Confirmed: Yes  MRN: [id number]  Referring Provider: Yash Hines MD    Medical Diagnosis: Radiculopathy, lumbar region [M54.16]       Treatment Diagnosis: LBP, radiating pain, weakness, decreased mobility    Visit Information:  Insurance: Payor: [employer] / Plan: [employer] [place] MEDICAID / Product Type: *No Product type* /   PT Visit Information  Total # of Visits Approved: 1  Total # of Visits to Date: 4  Plan of Care/Certification Expiration Date: 10/04/24  No Show: 0  Canceled Appointment: 0  Progress Note Counter: 3/12 ( units 10/4/24)    Subjective Information:  Subjective: Patient reports injections only lasted about a day. Patient reports increased generalized pain today. States Estim seems to help at end of sessions, states she has an Estim unit at home but is not as strong.  HEP Compliance:  [] Good [x] Fair [] Poor [] Reports not doing due to:               Pain Screening  Patient Currently in Pain: Yes  Pain Level: 6  Pain Location: Generalized  Pain Orientation: Left  Pain Descriptors: Aching    Treatment:  Exercises:  Exercises  Exercise 5: Hamstring str seated 3/30sec Marv  Exercise 6: Prone Quad str with strap 20sec x3 Marv  Exercise 8: Scifit L1.5 x5 min for mobility  Exercise 10: clamshells / rev clams (Fair tolerance) x10 ea b/l  Exercise 11: TA with wedge: Iso's 3'' x10, low marches, Hip ER  Exercise 12: Seated Pball*  Exercise 20: HEP: Continue current + hamstring str (issued verbally)       Manual:   Manual Therapy  Other: Static and gliding Cupping to lumbar paraspinals 8'       Modalities:  Cryotherapy (CPT 70683)  Patient Position: Seated  Cryotherapy location: Low back  Post treatment skin assessment: Intact  Limitations addressed: Pain modulation  Functional ability(s) targeted: Ambulating community distances, Performing self care activities,

## 2024-09-11 ENCOUNTER — HOSPITAL ENCOUNTER (OUTPATIENT)
Dept: PHYSICAL THERAPY | Age: 49
Setting detail: THERAPIES SERIES
Discharge: HOME | End: 2024-09-11
Payer: COMMERCIAL

## 2024-09-11 PROCEDURE — 97110 THERAPEUTIC EXERCISES: CPT

## 2024-09-11 PROCEDURE — G0283 ELEC STIM OTHER THAN WOUND: HCPCS

## 2024-09-11 ASSESSMENT — PAIN DESCRIPTION - PAIN TYPE: TYPE: CHRONIC PAIN

## 2024-09-11 ASSESSMENT — PAIN SCALES - GENERAL: PAINLEVEL_OUTOF10: 6

## 2024-09-11 ASSESSMENT — PAIN DESCRIPTION - LOCATION: LOCATION: BACK;LEG

## 2024-09-11 ASSESSMENT — PAIN DESCRIPTION - DESCRIPTORS: DESCRIPTORS: ACHING;THROBBING;TIGHTNESS

## 2024-09-11 ASSESSMENT — PAIN DESCRIPTION - ORIENTATION: ORIENTATION: LEFT;LOWER

## 2024-09-16 ENCOUNTER — INITIAL CONSULT (OUTPATIENT)
Age: 49
End: 2024-09-16
Payer: COMMERCIAL

## 2024-09-16 VITALS
DIASTOLIC BLOOD PRESSURE: 84 MMHG | WEIGHT: 220 LBS | BODY MASS INDEX: 36.65 KG/M2 | HEIGHT: 65 IN | SYSTOLIC BLOOD PRESSURE: 148 MMHG | TEMPERATURE: 97.7 F

## 2024-09-16 DIAGNOSIS — M54.16 LUMBAR RADICULOPATHY: ICD-10-CM

## 2024-09-16 DIAGNOSIS — M47.817 LUMBOSACRAL SPONDYLOSIS WITHOUT MYELOPATHY: ICD-10-CM

## 2024-09-16 DIAGNOSIS — M96.1 LUMBAR POST-LAMINECTOMY SYNDROME: Primary | ICD-10-CM

## 2024-09-16 DIAGNOSIS — M51.36 LUMBAR DEGENERATIVE DISC DISEASE: ICD-10-CM

## 2024-09-16 PROCEDURE — G8417 CALC BMI ABV UP PARAM F/U: HCPCS | Performed by: PAIN MEDICINE

## 2024-09-16 PROCEDURE — 99203 OFFICE O/P NEW LOW 30 MIN: CPT

## 2024-09-16 PROCEDURE — 1036F TOBACCO NON-USER: CPT | Performed by: PAIN MEDICINE

## 2024-09-16 PROCEDURE — G8427 DOCREV CUR MEDS BY ELIG CLIN: HCPCS | Performed by: PAIN MEDICINE

## 2024-09-16 PROCEDURE — 99213 OFFICE O/P EST LOW 20 MIN: CPT | Performed by: PAIN MEDICINE

## 2024-09-16 RX ORDER — OXYCODONE AND ACETAMINOPHEN 7.5; 325 MG/1; MG/1
1 TABLET ORAL EVERY 8 HOURS PRN
COMMUNITY
Start: 2024-08-27 | End: 2024-09-16

## 2024-09-16 ASSESSMENT — ENCOUNTER SYMPTOMS
EYES NEGATIVE: 1
GASTROINTESTINAL NEGATIVE: 1
ALLERGIC/IMMUNOLOGIC NEGATIVE: 1
RESPIRATORY NEGATIVE: 1

## 2024-09-18 ENCOUNTER — HOSPITAL ENCOUNTER (OUTPATIENT)
Dept: PHYSICAL THERAPY | Age: 49
Setting detail: THERAPIES SERIES
Discharge: HOME | End: 2024-09-18
Payer: COMMERCIAL

## 2024-09-25 ENCOUNTER — HOSPITAL ENCOUNTER (OUTPATIENT)
Dept: PHYSICAL THERAPY | Age: 49
Setting detail: THERAPIES SERIES
Discharge: HOME | End: 2024-09-25
Payer: COMMERCIAL

## 2024-09-25 PROCEDURE — G0283 ELEC STIM OTHER THAN WOUND: HCPCS

## 2024-09-25 PROCEDURE — 97110 THERAPEUTIC EXERCISES: CPT

## 2024-09-25 ASSESSMENT — PAIN SCALES - GENERAL: PAINLEVEL_OUTOF10: 6

## 2024-09-25 ASSESSMENT — PAIN DESCRIPTION - ORIENTATION: ORIENTATION: LEFT;LOWER

## 2024-09-25 ASSESSMENT — PAIN DESCRIPTION - LOCATION: LOCATION: BACK;FOOT

## 2024-09-25 ASSESSMENT — PAIN DESCRIPTION - PAIN TYPE: TYPE: CHRONIC PAIN

## 2024-09-25 ASSESSMENT — PAIN DESCRIPTION - DESCRIPTORS: DESCRIPTORS: ACHING;THROBBING;TIGHTNESS

## 2024-10-02 ENCOUNTER — HOSPITAL ENCOUNTER (OUTPATIENT)
Dept: PHYSICAL THERAPY | Age: 49
Setting detail: THERAPIES SERIES
Discharge: HOME | End: 2024-10-02
Payer: COMMERCIAL

## 2024-10-02 PROCEDURE — G0283 ELEC STIM OTHER THAN WOUND: HCPCS

## 2024-10-02 PROCEDURE — 97110 THERAPEUTIC EXERCISES: CPT

## 2024-10-02 ASSESSMENT — PAIN DESCRIPTION - PAIN TYPE: TYPE: CHRONIC PAIN

## 2024-10-02 ASSESSMENT — PAIN DESCRIPTION - DESCRIPTORS: DESCRIPTORS: ACHING;THROBBING;TIGHTNESS

## 2024-10-02 ASSESSMENT — PAIN DESCRIPTION - ORIENTATION: ORIENTATION: LEFT;LOWER

## 2024-10-02 ASSESSMENT — PAIN DESCRIPTION - LOCATION: LOCATION: BACK;FOOT

## 2024-10-02 ASSESSMENT — PAIN SCALES - GENERAL: PAINLEVEL_OUTOF10: 6

## 2024-10-02 NOTE — PROGRESS NOTES
[facility]  Outpatient Physical Therapy   Treatment Note        Date: 10/2/2024  Patient: Elizabeth Stratton  : 1975   Confirmed: Yes  MRN: [id number]  Referring Provider: Yash Hines MD      Medical Diagnosis: Radiculopathy, lumbar region [M54.16]      Treatment Diagnosis: LBP, radiating pain, weakness, decreased mobility    Visit Information:  Insurance: [facility] / [facility]: [facility] [place] MEDICAID / Product Type: *No Product type* /   PT Visit Information  Total # of Visits to Date: 7  Plan of Care/Certification Expiration Date: 10/04/24  No Show: 0  Canceled Appointment: 1  Progress Note Counter:  ( units 10/4/24)    Subjective Information:  Subjective: Pt reported she has been hurting a lot this past week and has been very stiff being painful to do any movement.  Also pt noted her allergies have been awful the last few weeks.  HEP Compliance:  [x] Good [] Fair [] Poor [] Reports not doing due to:               Pain Screening  Patient Currently in Pain: Yes  Pain Assessment: 0-10  Pain Level: 6 (6.5)  Pain Type: Chronic pain  Pain Location: Back, Foot  Pain Orientation: Left, Lower  Pain Descriptors: Aching, Throbbing, Tightness    Treatment:  Exercises:  Exercises  Exercise 1: piriformis stretch seated 30\" x3 b/l  Exercise 3: 5x STS from mat with 0 UE support - 20.54s (decreased WBing on LLE)  Exercise 5: Hamstring str seated 3/30sec Marv  Exercise 7: step ups on foam with one UE support x10  Exercise 8: Scifit L2 x5 min for mobility  Exercise 11: TA iso : seated EOM 3\" x10 , marching x10  Exercise 12: Seated Pball : circles, ant/post/lateral rocking, marching x10 ea  Exercise 13: gait drills: fwd/retro no UE support x2 laps, lateral/march x2 laps with UE support (increased pain)  Exercise 20: HEP: Continue current + seated piriformis stretch (issued verbally)       Modalities:  Electric stimulation, unattended (CPT 84758) /  (Medicare)  Patient Position: Seated  E-stim

## 2024-11-20 ENCOUNTER — OFFICE VISIT (OUTPATIENT)
Age: 49
End: 2024-11-20
Payer: COMMERCIAL

## 2024-11-20 VITALS
WEIGHT: 222 LBS | HEIGHT: 65 IN | DIASTOLIC BLOOD PRESSURE: 86 MMHG | BODY MASS INDEX: 36.99 KG/M2 | SYSTOLIC BLOOD PRESSURE: 136 MMHG | TEMPERATURE: 97.8 F

## 2024-11-20 DIAGNOSIS — M96.1 LUMBAR POST-LAMINECTOMY SYNDROME: Primary | ICD-10-CM

## 2024-11-20 DIAGNOSIS — M54.16 LUMBAR RADICULOPATHY: ICD-10-CM

## 2024-11-20 PROCEDURE — G8427 DOCREV CUR MEDS BY ELIG CLIN: HCPCS | Performed by: PAIN MEDICINE

## 2024-11-20 PROCEDURE — 99215 OFFICE O/P EST HI 40 MIN: CPT | Performed by: PAIN MEDICINE

## 2024-11-20 PROCEDURE — G8484 FLU IMMUNIZE NO ADMIN: HCPCS | Performed by: PAIN MEDICINE

## 2024-11-20 PROCEDURE — 99213 OFFICE O/P EST LOW 20 MIN: CPT | Performed by: PAIN MEDICINE

## 2024-11-20 PROCEDURE — G8417 CALC BMI ABV UP PARAM F/U: HCPCS | Performed by: PAIN MEDICINE

## 2024-11-20 PROCEDURE — 1036F TOBACCO NON-USER: CPT | Performed by: PAIN MEDICINE

## 2024-11-20 RX ORDER — OXYCODONE AND ACETAMINOPHEN 7.5; 325 MG/1; MG/1
1 TABLET ORAL EVERY 6 HOURS PRN
COMMUNITY

## 2024-11-20 RX ORDER — PREDNISONE 20 MG/1
20 TABLET ORAL
COMMUNITY

## 2024-11-20 RX ORDER — GABAPENTIN 800 MG/1
800 TABLET ORAL 4 TIMES DAILY
Qty: 240 TABLET | Refills: 3 | Status: SHIPPED | OUTPATIENT
Start: 2024-11-20 | End: 2025-01-19

## 2024-11-20 ASSESSMENT — ENCOUNTER SYMPTOMS
ALLERGIC/IMMUNOLOGIC NEGATIVE: 1
EYES NEGATIVE: 1
GASTROINTESTINAL NEGATIVE: 1
RESPIRATORY NEGATIVE: 1

## 2024-11-20 NOTE — PROGRESS NOTES
History of Present Illness     Patient Identification  Elizabeth Stratton is a 49 y.o. female.    Patient information was obtained from patient.      Chief Complaint   Chief Complaint   Patient presents with    Back Pain     Lower back pain radiating down bilateral legs     Leg Pain     Bilateral     Other     EMG done yesterday: Lower Ext        Patient presents with complaint of back pain. This is a result of no known injury. Onset of pain was 2 years ago and has been gradually worsening since. The pain is located in left lower back, described as aching and dull and rated as moderate and severe, without radiation. Symptoms include no other symptoms. Had radicular symptoms better after Microdiscectomy The patient also complains of fever, dysuria, weight loss, history of cancer, history of osteoporosis, history of steroid use, Has h/o MS. The patient denies weakness, numbness, incontinence. The patient denies other injuries. Care prior to arrival consisted of Surgery with moderate relief. IN PT Now.  July 24 2024: Left L5-S1 lysis of scar, microdiskectomy.   EMG shows Bilateral L5-S1 Radiculitis,    Past Medical History:   Diagnosis Date    Arthritis     Chronic back pain     Chronic headaches     Depression     H/O multiple sclerosis (HCC)     History of blood transfusion     Hyperlipidemia     Hypertension     Seizures (HCC)     last seizure 4 years ago.     Family History   Problem Relation Age of Onset    Arthritis Mother     Diabetes Father     No Known Problems Sister     No Known Problems Brother     Stroke Maternal Grandmother     Diabetes Maternal Grandmother     Heart Disease Maternal Grandmother     Cancer Maternal Grandmother     Cancer Maternal Grandfather     Diabetes Maternal Grandfather     Cancer Paternal Grandmother     Stroke Paternal Grandmother     Anorexia Nervosa Daughter     Colon Cancer Neg Hx      Current Outpatient Medications   Medication Sig Dispense Refill    predniSONE (DELTASONE) 20

## 2024-11-25 ENCOUNTER — TELEPHONE (OUTPATIENT)
Age: 49
End: 2024-11-25

## 2024-11-25 NOTE — TELEPHONE ENCOUNTER
RIGHT L4-5 VS L5-S1  AUTH DATES:  11/27/24-2/27/25  AUTH # [id number]  REF # [id number]      OK to schedule procedure approved as above.   Please note sides/levels approved and date range.   (If applicable, sides/levels approved may differ from those ordered)       TO BE SCHEDULED WITH

## 2024-11-27 ENCOUNTER — APPOINTMENT (OUTPATIENT)
Dept: GENERAL RADIOLOGY | Age: 49
End: 2024-11-27
Payer: COMMERCIAL

## 2024-11-27 ENCOUNTER — HOSPITAL ENCOUNTER (EMERGENCY)
Age: 49
Discharge: HOME | End: 2024-11-27
Attending: EMERGENCY MEDICINE
Payer: COMMERCIAL

## 2024-11-27 ENCOUNTER — PROCEDURE VISIT (OUTPATIENT)
Age: 49
End: 2024-11-27
Payer: COMMERCIAL

## 2024-11-27 VITALS
BODY MASS INDEX: 36.65 KG/M2 | TEMPERATURE: 97.6 F | WEIGHT: 220 LBS | HEART RATE: 96 BPM | DIASTOLIC BLOOD PRESSURE: 78 MMHG | RESPIRATION RATE: 19 BRPM | OXYGEN SATURATION: 97 % | SYSTOLIC BLOOD PRESSURE: 116 MMHG | HEIGHT: 65 IN

## 2024-11-27 VITALS
HEART RATE: 65 BPM | TEMPERATURE: 97.7 F | BODY MASS INDEX: 36.65 KG/M2 | OXYGEN SATURATION: 99 % | WEIGHT: 220 LBS | DIASTOLIC BLOOD PRESSURE: 80 MMHG | SYSTOLIC BLOOD PRESSURE: 128 MMHG | HEIGHT: 65 IN

## 2024-11-27 DIAGNOSIS — T78.2XXA ANAPHYLAXIS, INITIAL ENCOUNTER: Primary | ICD-10-CM

## 2024-11-27 DIAGNOSIS — M96.1 LUMBAR POST-LAMINECTOMY SYNDROME: ICD-10-CM

## 2024-11-27 DIAGNOSIS — M54.16 LUMBAR RADICULOPATHY: Primary | ICD-10-CM

## 2024-11-27 LAB
ALBUMIN SERPL-MCNC: 4.2 G/DL (ref 3.5–4.6)
ALP SERPL-CCNC: 89 U/L (ref 40–130)
ALT SERPL-CCNC: 24 U/L (ref 0–33)
ANION GAP SERPL CALCULATED.3IONS-SCNC: 18 MEQ/L (ref 9–15)
APTT PPP: 36.5 SEC (ref 24.4–36.8)
AST SERPL-CCNC: 23 U/L (ref 0–35)
BASOPHILS # BLD: 0 K/UL (ref 0–0.2)
BASOPHILS NFR BLD: 0.2 %
BILIRUB SERPL-MCNC: 0.4 MG/DL (ref 0.2–0.7)
BUN SERPL-MCNC: 7 MG/DL (ref 6–20)
CALCIUM SERPL-MCNC: 9.1 MG/DL (ref 8.5–9.9)
CHLORIDE SERPL-SCNC: 100 MEQ/L (ref 95–107)
CO2 SERPL-SCNC: 22 MEQ/L (ref 20–31)
CREAT SERPL-MCNC: 1.1 MG/DL (ref 0.5–0.9)
EKG ATRIAL RATE: 89 BPM
EKG P AXIS: 48 DEGREES
EKG P-R INTERVAL: 200 MS
EKG Q-T INTERVAL: 400 MS
EKG QRS DURATION: 86 MS
EKG QTC CALCULATION (BAZETT): 486 MS
EKG R AXIS: 6 DEGREES
EKG T AXIS: 26 DEGREES
EKG VENTRICULAR RATE: 89 BPM
EOSINOPHIL # BLD: 0 K/UL (ref 0–0.7)
EOSINOPHIL NFR BLD: 0.5 %
ERYTHROCYTE [DISTWIDTH] IN BLOOD BY AUTOMATED COUNT: 15.9 % (ref 11.5–14.5)
GLOBULIN SER CALC-MCNC: 2.4 G/DL (ref 2.3–3.5)
GLUCOSE SERPL-MCNC: 132 MG/DL (ref 70–99)
HCT VFR BLD AUTO: 48.7 % (ref 37–47)
HGB BLD-MCNC: 15.7 G/DL (ref 12–16)
INR PPP: 1.7
LYMPHOCYTES # BLD: 5.6 K/UL (ref 1–4.8)
LYMPHOCYTES NFR BLD: 50 %
MAGNESIUM SERPL-MCNC: 2.2 MG/DL (ref 1.7–2.4)
MCH RBC QN AUTO: 26.2 PG (ref 27–31.3)
MCHC RBC AUTO-ENTMCNC: 32.2 % (ref 33–37)
MCV RBC AUTO: 81.2 FL (ref 79.4–94.8)
MONOCYTES # BLD: 0.4 K/UL (ref 0.2–0.8)
MONOCYTES NFR BLD: 3.8 %
NEUTROPHILS # BLD: 5.2 K/UL (ref 1.4–6.5)
NEUTS SEG NFR BLD: 46 %
PLATELET # BLD AUTO: 373 K/UL (ref 130–400)
PLATELET BLD QL SMEAR: ABNORMAL
POTASSIUM SERPL-SCNC: 3.1 MEQ/L (ref 3.4–4.9)
PROT SERPL-MCNC: 6.6 G/DL (ref 6.3–8)
PROTHROMBIN TIME: 20.4 SEC (ref 12.3–14.9)
RBC # BLD AUTO: 6 M/UL (ref 4.2–5.4)
SLIDE REVIEW: ABNORMAL
SMUDGE CELLS BLD QL SMEAR: 9.6
SODIUM SERPL-SCNC: 140 MEQ/L (ref 135–144)
T4 FREE SERPL-MCNC: 1.03 NG/DL (ref 0.84–1.68)
TROPONIN, HIGH SENSITIVITY: 12 NG/L (ref 0–19)
TROPONIN, HIGH SENSITIVITY: 14 NG/L (ref 0–19)
TROPONIN, HIGH SENSITIVITY: 7 NG/L (ref 0–19)
TSH REFLEX: 5.59 UIU/ML (ref 0.44–3.86)
WBC # BLD AUTO: 11.2 K/UL (ref 4.8–10.8)

## 2024-11-27 PROCEDURE — 85730 THROMBOPLASTIN TIME PARTIAL: CPT

## 2024-11-27 PROCEDURE — 93005 ELECTROCARDIOGRAM TRACING: CPT

## 2024-11-27 PROCEDURE — 96375 TX/PRO/DX INJ NEW DRUG ADDON: CPT

## 2024-11-27 PROCEDURE — 6370000000 HC RX 637 (ALT 250 FOR IP): Performed by: EMERGENCY MEDICINE

## 2024-11-27 PROCEDURE — 84484 ASSAY OF TROPONIN QUANT: CPT

## 2024-11-27 PROCEDURE — 90714 TD VACC NO PRESV 7 YRS+ IM: CPT | Performed by: EMERGENCY MEDICINE

## 2024-11-27 PROCEDURE — 64483 NJX AA&/STRD TFRM EPI L/S 1: CPT | Performed by: PAIN MEDICINE

## 2024-11-27 PROCEDURE — 71045 X-RAY EXAM CHEST 1 VIEW: CPT

## 2024-11-27 PROCEDURE — 80053 COMPREHEN METABOLIC PANEL: CPT

## 2024-11-27 PROCEDURE — 6360000002 HC RX W HCPCS: Performed by: EMERGENCY MEDICINE

## 2024-11-27 PROCEDURE — 2500000003 HC RX 250 WO HCPCS: Performed by: EMERGENCY MEDICINE

## 2024-11-27 PROCEDURE — 99285 EMERGENCY DEPT VISIT HI MDM: CPT

## 2024-11-27 PROCEDURE — 90471 IMMUNIZATION ADMIN: CPT | Performed by: EMERGENCY MEDICINE

## 2024-11-27 PROCEDURE — 83735 ASSAY OF MAGNESIUM: CPT

## 2024-11-27 PROCEDURE — 84439 ASSAY OF FREE THYROXINE: CPT

## 2024-11-27 PROCEDURE — 96372 THER/PROPH/DIAG INJ SC/IM: CPT

## 2024-11-27 PROCEDURE — 96361 HYDRATE IV INFUSION ADD-ON: CPT

## 2024-11-27 PROCEDURE — 85025 COMPLETE CBC W/AUTO DIFF WBC: CPT

## 2024-11-27 PROCEDURE — 2580000003 HC RX 258: Performed by: EMERGENCY MEDICINE

## 2024-11-27 PROCEDURE — 96374 THER/PROPH/DIAG INJ IV PUSH: CPT

## 2024-11-27 PROCEDURE — 85610 PROTHROMBIN TIME: CPT

## 2024-11-27 PROCEDURE — 84443 ASSAY THYROID STIM HORMONE: CPT

## 2024-11-27 RX ORDER — PREDNISONE 10 MG/1
TABLET ORAL
Qty: 30 TABLET | Refills: 0 | Status: SHIPPED | OUTPATIENT
Start: 2024-11-27

## 2024-11-27 RX ORDER — DIPHENHYDRAMINE HYDROCHLORIDE 50 MG/ML
50 INJECTION INTRAMUSCULAR; INTRAVENOUS ONCE
Status: COMPLETED | OUTPATIENT
Start: 2024-11-27 | End: 2024-11-27

## 2024-11-27 RX ORDER — LIDOCAINE HYDROCHLORIDE 10 MG/ML
3 INJECTION, SOLUTION EPIDURAL; INFILTRATION; INTRACAUDAL; PERINEURAL ONCE
Status: COMPLETED | OUTPATIENT
Start: 2024-11-27 | End: 2024-11-27

## 2024-11-27 RX ORDER — 0.9 % SODIUM CHLORIDE 0.9 %
2000 INTRAVENOUS SOLUTION INTRAVENOUS ONCE
Status: COMPLETED | OUTPATIENT
Start: 2024-11-27 | End: 2024-11-27

## 2024-11-27 RX ORDER — FAMOTIDINE 20 MG/1
20 TABLET, FILM COATED ORAL 2 TIMES DAILY
Qty: 20 TABLET | Refills: 0 | Status: SHIPPED | OUTPATIENT
Start: 2024-11-27 | End: 2024-12-07

## 2024-11-27 RX ORDER — TRIAMCINOLONE ACETONIDE 40 MG/ML
40 INJECTION, SUSPENSION INTRA-ARTICULAR; INTRAMUSCULAR ONCE
Status: COMPLETED | OUTPATIENT
Start: 2024-11-27 | End: 2024-11-27

## 2024-11-27 RX ORDER — BUPIVACAINE HYDROCHLORIDE 2.5 MG/ML
2 INJECTION, SOLUTION EPIDURAL; INFILTRATION; INTRACAUDAL ONCE
Status: COMPLETED | OUTPATIENT
Start: 2024-11-27 | End: 2024-11-27

## 2024-11-27 RX ORDER — EPINEPHRINE 1 MG/ML
0.3 INJECTION, SOLUTION, CONCENTRATE INTRAVENOUS ONCE
Status: COMPLETED | OUTPATIENT
Start: 2024-11-27 | End: 2024-11-27

## 2024-11-27 RX ORDER — DIPHENHYDRAMINE HCL 25 MG
25 TABLET ORAL EVERY 6 HOURS PRN
Qty: 30 TABLET | Refills: 0 | Status: SHIPPED | OUTPATIENT
Start: 2024-11-27 | End: 2024-12-27

## 2024-11-27 RX ORDER — LANOLIN ALCOHOL/MO/W.PET/CERES
400 CREAM (GRAM) TOPICAL ONCE
Status: COMPLETED | OUTPATIENT
Start: 2024-11-27 | End: 2024-11-27

## 2024-11-27 RX ORDER — LORAZEPAM 1 MG/1
1 TABLET ORAL ONCE
Status: COMPLETED | OUTPATIENT
Start: 2024-11-27 | End: 2024-11-27

## 2024-11-27 RX ADMIN — CLOSTRIDIUM TETANI TOXOID ANTIGEN (FORMALDEHYDE INACTIVATED) AND CORYNEBACTERIUM DIPHTHERIAE TOXOID ANTIGEN (FORMALDEHYDE INACTIVATED) 0.5 ML: 5; 2 INJECTION, SUSPENSION INTRAMUSCULAR at 14:36

## 2024-11-27 RX ADMIN — LIDOCAINE HYDROCHLORIDE 3 MG: 10 INJECTION, SOLUTION EPIDURAL; INFILTRATION; INTRACAUDAL; PERINEURAL at 15:07

## 2024-11-27 RX ADMIN — BUPIVACAINE HYDROCHLORIDE 2 MG: 2.5 INJECTION, SOLUTION EPIDURAL; INFILTRATION; INTRACAUDAL at 15:06

## 2024-11-27 RX ADMIN — TRIAMCINOLONE ACETONIDE 40 MG: 40 INJECTION, SUSPENSION INTRA-ARTICULAR; INTRAMUSCULAR at 15:07

## 2024-11-27 RX ADMIN — LORAZEPAM 1 MG: 1 TABLET ORAL at 15:49

## 2024-11-27 RX ADMIN — Medication 400 MG: at 15:49

## 2024-11-27 RX ADMIN — EPINEPHRINE 0.3 MG: 1 INJECTION, SOLUTION, CONCENTRATE INTRAVENOUS at 14:27

## 2024-11-27 RX ADMIN — SODIUM CHLORIDE 2000 ML: 9 INJECTION, SOLUTION INTRAVENOUS at 14:30

## 2024-11-27 RX ADMIN — METHYLPREDNISOLONE SODIUM SUCCINATE 125 MG: 125 INJECTION INTRAMUSCULAR; INTRAVENOUS at 14:39

## 2024-11-27 RX ADMIN — DIPHENHYDRAMINE HYDROCHLORIDE 50 MG: 50 INJECTION INTRAMUSCULAR; INTRAVENOUS at 14:26

## 2024-11-27 RX ADMIN — POTASSIUM BICARBONATE 50 MEQ: 978 TABLET, EFFERVESCENT ORAL at 15:49

## 2024-11-27 RX ADMIN — FAMOTIDINE 20 MG: 10 INJECTION, SOLUTION INTRAVENOUS at 14:27

## 2024-11-27 ASSESSMENT — PAIN SCALES - GENERAL
PAINLEVEL_OUTOF10: 6
PAINLEVEL_OUTOF10: 5

## 2024-11-27 ASSESSMENT — ENCOUNTER SYMPTOMS
ABDOMINAL PAIN: 0
SHORTNESS OF BREATH: 0
GASTROINTESTINAL NEGATIVE: 1
EYES NEGATIVE: 1
ALLERGIC/IMMUNOLOGIC NEGATIVE: 1
VOMITING: 0
RESPIRATORY NEGATIVE: 1

## 2024-11-27 ASSESSMENT — PAIN DESCRIPTION - LOCATION
LOCATION: BACK
LOCATION: BACK

## 2024-11-27 ASSESSMENT — LIFESTYLE VARIABLES
HOW MANY STANDARD DRINKS CONTAINING ALCOHOL DO YOU HAVE ON A TYPICAL DAY: 1 OR 2
HOW OFTEN DO YOU HAVE A DRINK CONTAINING ALCOHOL: MONTHLY OR LESS

## 2024-11-27 ASSESSMENT — PAIN - FUNCTIONAL ASSESSMENT: PAIN_FUNCTIONAL_ASSESSMENT: NONE - DENIES PAIN

## 2024-11-27 NOTE — PROGRESS NOTES
History of Present Illness     Patient Identification  Elizabeth Stratton is a 49 y.o. female.    Patient information was obtained from patient.      Chief Complaint   Chief Complaint   Patient presents with    Back Pain     Right Lumbar       Patient presents for Rt L5-S1 TFESI with complaint of back pain. This is a result of no known injury. Onset of pain was 2 years ago and has been gradually worsening since. The pain is located in left lower back, described as aching and dull and rated as moderate and severe, without radiation. Symptoms include no other symptoms. Had radicular symptoms better after Microdiscectomy The patient also complains of fever, dysuria, weight loss, history of cancer, history of osteoporosis, history of steroid use, Has h/o MS. The patient denies weakness, numbness, incontinence. The patient denies other injuries. Care prior to arrival consisted of Surgery with moderate relief. IN PT Now.  July 24 2024: Left L5-S1 lysis of scar, microdiskectomy.   EMG shows Bilateral L5-S1 Radiculitis,    Past Medical History:   Diagnosis Date    Arthritis     Chronic back pain     Chronic headaches     Depression     H/O multiple sclerosis (HCC)     History of blood transfusion     Hyperlipidemia     Hypertension     Seizures (HCC)     last seizure 4 years ago.     Family History   Problem Relation Age of Onset    Arthritis Mother     Diabetes Father     No Known Problems Sister     No Known Problems Brother     Stroke Maternal Grandmother     Diabetes Maternal Grandmother     Heart Disease Maternal Grandmother     Cancer Maternal Grandmother     Cancer Maternal Grandfather     Diabetes Maternal Grandfather     Cancer Paternal Grandmother     Stroke Paternal Grandmother     Anorexia Nervosa Daughter     Colon Cancer Neg Hx      Current Outpatient Medications   Medication Sig Dispense Refill    predniSONE (DELTASONE) 20 MG tablet Take 1 tablet by mouth      oxyCODONE-acetaminophen (PERCOCET) 7.5-325 MG per

## 2024-11-27 NOTE — PROGRESS NOTES
Spiritual Health History and Assessment/Progress Note  [facility]    Crisis, Rapid Response,  ,      Name: Elizabeth Stratton MRN: [id number]    Age: 49 y.o.     Sex: female   Language: English   Sikh: None   <principal problem not specified>     Date: 11/27/2024            Total Time Calculated: 30 min              Spiritual Assessment began in [facility] ED        Referral/Consult From: Multi-disciplinary team   Encounter Overview/Reason: Crisis  Service Provided For: Patient and family together    Patient was outpatient and had allergic reaction and fainted. Patients daughter called out for help in hospital and Rapid called. Patient taken to ED where she became lucide and benadryl was administered.  took daughter who is twelve to waiting room. She was visible shaking and worried.  stayed with daughter until grandmother arrived.  checked on patient who is doing better and asked to relate this to family.  talked with nurse who instructed they could all come back in a few minutes to see patient.  talked with family who were relieved but still concerned.    Kenia, Belief, Meaning:   Patient unable to assess at this time  Family/Friends Other: Unable to assess      Importance and Influence:  Patient unable to assess at this time  Family/Friends Other: unable to assess    Community:  Patient feels well-supported. Support system includes: Parent/s, Children, and Extended family  Family/Friends feel well-supported. Support system includes: Parent/s, Friends, and Extended family    Assessment and Plan of Care:     Patient Interventions include: Facilitated expression of thoughts and feelings  Family/Friends Interventions include: Facilitated expression of thoughts and feelings    Patient Plan of Care: Spiritual Care available upon further referral  Family/Friends Plan of Care: Spiritual Care available upon further referral    Electronically signed by  Chaplain Yajaira Intern on 11/27/2024 at 2:35 PM

## 2024-11-27 NOTE — ED PROVIDER NOTES
Feeling of Stress : Very much   Social Connections: Socially Isolated (8/5/2022)    [facility]    Social Connection and Isolation Panel [NHANES]     Frequency of Communication with Friends and Family: Once a week     Frequency of Social Gatherings with Friends and Family: Once a week     Attends Rastafarian Services: Never     Active Member of Clubs or Organizations: No     Attends Club or Organization Meetings: Never     Marital Status: Never    Housing Stability: Patient Declined (7/24/2024)    Housing Stability Vital Sign     Unable to Pay for Housing in the Last Year: Patient declined     Unstable Housing in the Last Year: Patient declined       SCREENINGS         Hayward Coma Scale  Eye Opening: Spontaneous  Best Verbal Response: Oriented  Best Motor Response: Obeys commands  Hayward Coma Scale Score: 15                     CIWA Assessment  BP: 117/78  Pulse: 99                 PHYSICAL EXAM    (up to 7 for level 4, 8 or more for level 5)     ED Triage Vitals   BP Systolic BP Percentile Diastolic BP Percentile Temp Temp src Pulse Resp SpO2   -- -- -- -- -- -- -- --      Height Weight         -- --             Physical Exam  Constitutional:       Appearance: She is obese. She is not diaphoretic.   HENT:      Head: Atraumatic.      Nose: Nose normal.      Mouth/Throat:      Mouth: Mucous membranes are moist.      Pharynx: Oropharynx is clear.   Eyes:      General: No scleral icterus.     Extraocular Movements: Extraocular movements intact.      Pupils: Pupils are equal, round, and reactive to light.   Neck:      Comments: No JVD  Cardiovascular:      Rate and Rhythm: Normal rate and regular rhythm.      Pulses: Normal pulses.   Pulmonary:      Effort: Pulmonary effort is normal.      Breath sounds: Normal breath sounds. No wheezing.   Abdominal:      Tenderness: There is no abdominal tenderness. There is no right CVA tenderness, left CVA tenderness, guarding or  15.9 (*)     Lymphocytes Absolute 5.6 (*)     All other components within normal limits   COMPREHENSIVE METABOLIC PANEL - Abnormal; Notable for the following components:    Potassium 3.1 (*)     Anion Gap 18 (*)     Glucose 132 (*)     Creatinine 1.10 (*)     All other components within normal limits   PROTIME-INR - Abnormal; Notable for the following components:    Protime 20.4 (*)     All other components within normal limits   TSH WITH REFLEX - Abnormal; Notable for the following components:    TSH 5.590 (*)     All other components within normal limits   MAGNESIUM   APTT   TROPONIN   TROPONIN   TROPONIN   T4, FREE       All other labs were within normal range or not returned as of this dictation.    EMERGENCY DEPARTMENT COURSE and DIFFERENTIAL DIAGNOSIS/MDM:   Vitals:    Vitals:    11/27/24 1500 11/27/24 1507 11/27/24 1530 11/27/24 1630   BP: 116/74 130/75 126/73 117/78   Pulse: 94 84 90 99   Resp: 22  14 15   Temp:       TempSrc:       SpO2: 95%  96% 96%   Weight:       Height:           Per our high-sensitivity troponin algorithm patient's presentation not consistent with acute myocardial ischemia  History and physical not highly suspicious of CVA. NEXUS cpsine/head neg  Potassium repleted.  Reassuring EKG.  Medical Decision Making  Amount and/or Complexity of Data Reviewed  Labs: ordered.  Radiology: ordered.  ECG/medicine tests: ordered.    Risk  OTC drugs.  Prescription drug management.    Patient presents emergency department after a syncopal episode.  Initial exam notes hives to the abdomen, lip swelling consistent with anaphylaxis.  I believe this adequately explains presentation.  EKG obtained, labs sent, given intramuscular epinephrine, IV Solu-Medrol, Benadryl, Pepcid, fluid bolus given hypotension, will reevaluate.  Hemodynamically stable  This seems to be the second time she received iodinated contrast that she can remember I believe that is the most likely precipitant  Her symptoms have resolved

## 2024-12-02 LAB
EKG ATRIAL RATE: 89 BPM
EKG P AXIS: 48 DEGREES
EKG P-R INTERVAL: 200 MS
EKG Q-T INTERVAL: 400 MS
EKG QRS DURATION: 86 MS
EKG QTC CALCULATION (BAZETT): 486 MS
EKG R AXIS: 6 DEGREES
EKG T AXIS: 26 DEGREES
EKG VENTRICULAR RATE: 89 BPM

## 2024-12-17 SDOH — HEALTH STABILITY: PHYSICAL HEALTH: ON AVERAGE, HOW MANY DAYS PER WEEK DO YOU ENGAGE IN MODERATE TO STRENUOUS EXERCISE (LIKE A BRISK WALK)?: 0 DAYS

## 2024-12-17 SDOH — HEALTH STABILITY: PHYSICAL HEALTH: ON AVERAGE, HOW MANY MINUTES DO YOU ENGAGE IN EXERCISE AT THIS LEVEL?: PATIENT DECLINED

## 2024-12-18 ENCOUNTER — OFFICE VISIT (OUTPATIENT)
Dept: PRIMARY CARE CLINIC | Age: 49
End: 2024-12-18
Payer: COMMERCIAL

## 2024-12-18 VITALS
SYSTOLIC BLOOD PRESSURE: 134 MMHG | WEIGHT: 220 LBS | HEART RATE: 84 BPM | HEIGHT: 65 IN | BODY MASS INDEX: 36.65 KG/M2 | OXYGEN SATURATION: 98 % | DIASTOLIC BLOOD PRESSURE: 96 MMHG

## 2024-12-18 DIAGNOSIS — G35 HX OF MULTIPLE SCLEROSIS (HCC): ICD-10-CM

## 2024-12-18 DIAGNOSIS — R73.9 HYPERGLYCEMIA: ICD-10-CM

## 2024-12-18 DIAGNOSIS — R56.9 SEIZURE (HCC): ICD-10-CM

## 2024-12-18 DIAGNOSIS — R07.89 OTHER CHEST PAIN: Primary | ICD-10-CM

## 2024-12-18 DIAGNOSIS — E78.2 MIXED HYPERLIPIDEMIA: ICD-10-CM

## 2024-12-18 PROCEDURE — G8484 FLU IMMUNIZE NO ADMIN: HCPCS | Performed by: INTERNAL MEDICINE

## 2024-12-18 PROCEDURE — 99214 OFFICE O/P EST MOD 30 MIN: CPT | Performed by: INTERNAL MEDICINE

## 2024-12-18 PROCEDURE — G8427 DOCREV CUR MEDS BY ELIG CLIN: HCPCS | Performed by: INTERNAL MEDICINE

## 2024-12-18 PROCEDURE — 1036F TOBACCO NON-USER: CPT | Performed by: INTERNAL MEDICINE

## 2024-12-18 PROCEDURE — G8417 CALC BMI ABV UP PARAM F/U: HCPCS | Performed by: INTERNAL MEDICINE

## 2024-12-18 RX ORDER — DOXEPIN HYDROCHLORIDE 150 MG/1
150 CAPSULE ORAL NIGHTLY
COMMUNITY
Start: 2024-12-03

## 2024-12-18 RX ORDER — MEMANTINE HYDROCHLORIDE 5 MG/1
5 TABLET ORAL 2 TIMES DAILY
COMMUNITY
Start: 2024-09-10

## 2024-12-18 RX ORDER — SUMATRIPTAN SUCCINATE 100 MG/1
TABLET ORAL
COMMUNITY

## 2024-12-18 RX ORDER — ROSUVASTATIN CALCIUM 20 MG/1
20 TABLET, COATED ORAL NIGHTLY
Qty: 30 TABLET | Refills: 5 | Status: SHIPPED | OUTPATIENT
Start: 2024-12-18

## 2024-12-18 RX ORDER — DIPHENHYDRAMINE HYDROCHLORIDE 25 MG/1
CAPSULE ORAL
COMMUNITY
Start: 2024-11-27

## 2024-12-18 SDOH — ECONOMIC STABILITY: INCOME INSECURITY: HOW HARD IS IT FOR YOU TO PAY FOR THE VERY BASICS LIKE FOOD, HOUSING, MEDICAL CARE, AND HEATING?: NOT HARD AT ALL

## 2024-12-18 SDOH — ECONOMIC STABILITY: FOOD INSECURITY: WITHIN THE PAST 12 MONTHS, YOU WORRIED THAT YOUR FOOD WOULD RUN OUT BEFORE YOU GOT MONEY TO BUY MORE.: NEVER TRUE

## 2024-12-18 SDOH — ECONOMIC STABILITY: FOOD INSECURITY: WITHIN THE PAST 12 MONTHS, THE FOOD YOU BOUGHT JUST DIDN'T LAST AND YOU DIDN'T HAVE MONEY TO GET MORE.: NEVER TRUE

## 2024-12-18 ASSESSMENT — PATIENT HEALTH QUESTIONNAIRE - PHQ9
3. TROUBLE FALLING OR STAYING ASLEEP: NEARLY EVERY DAY
SUM OF ALL RESPONSES TO PHQ QUESTIONS 1-9: 18
SUM OF ALL RESPONSES TO PHQ9 QUESTIONS 1 & 2: 5
4. FEELING TIRED OR HAVING LITTLE ENERGY: NEARLY EVERY DAY
10. IF YOU CHECKED OFF ANY PROBLEMS, HOW DIFFICULT HAVE THESE PROBLEMS MADE IT FOR YOU TO DO YOUR WORK, TAKE CARE OF THINGS AT HOME, OR GET ALONG WITH OTHER PEOPLE: SOMEWHAT DIFFICULT
2. FEELING DOWN, DEPRESSED OR HOPELESS: MORE THAN HALF THE DAYS
9. THOUGHTS THAT YOU WOULD BE BETTER OFF DEAD, OR OF HURTING YOURSELF: NOT AT ALL
7. TROUBLE CONCENTRATING ON THINGS, SUCH AS READING THE NEWSPAPER OR WATCHING TELEVISION: NEARLY EVERY DAY
8. MOVING OR SPEAKING SO SLOWLY THAT OTHER PEOPLE COULD HAVE NOTICED. OR THE OPPOSITE, BEING SO FIGETY OR RESTLESS THAT YOU HAVE BEEN MOVING AROUND A LOT MORE THAN USUAL: MORE THAN HALF THE DAYS
6. FEELING BAD ABOUT YOURSELF - OR THAT YOU ARE A FAILURE OR HAVE LET YOURSELF OR YOUR FAMILY DOWN: NOT AT ALL
1. LITTLE INTEREST OR PLEASURE IN DOING THINGS: NEARLY EVERY DAY
SUM OF ALL RESPONSES TO PHQ QUESTIONS 1-9: 18
5. POOR APPETITE OR OVEREATING: MORE THAN HALF THE DAYS
SUM OF ALL RESPONSES TO PHQ QUESTIONS 1-9: 18
SUM OF ALL RESPONSES TO PHQ QUESTIONS 1-9: 18

## 2024-12-18 NOTE — PROGRESS NOTES
Elizabeth Stratton 49 y.o. female presents today with No chief complaint on file.      Hyperlipidemia  This is a chronic problem. The current episode started more than 1 year ago. The problem is uncontrolled. Recent lipid tests were reviewed and are high. Associated symptoms include chest pain. Current antihyperlipidemic treatment includes statins. The current treatment provides moderate improvement of lipids.   Chest Pain   This is a recurrent problem. The current episode started more than 1 month ago. The onset quality is gradual. The problem occurs rarely. The problem has been waxing and waning. The pain is present in the substernal region. The pain is at a severity of 1/10. The quality of the pain is described as dull. Associated symptoms include numbness. Pertinent negatives include no fever or palpitations.   Her past medical history is significant for hyperlipidemia and seizures.       Past Medical History:   Diagnosis Date    Arthritis     Chronic back pain     Chronic headaches     Depression     H/O multiple sclerosis (HCC)     History of blood transfusion     Hyperlipidemia     Hypertension     Seizures (HCC)     last seizure 4 years ago.     Patient Active Problem List    Diagnosis Date Noted    Polyp of colon 02/23/2023    Bilateral carpal tunnel syndrome 01/22/2023    Left lumbar radiculopathy 08/15/2024    Lumbar disc herniation 04/16/2024    Sacroiliitis (HCC) 03/28/2024    ADD (attention deficit disorder) without hyperactivity 02/08/2024    Herniated nucleus pulposus, L5-S1, left 07/11/2023    Smoker 07/11/2023    Anxiety 07/10/2023    Seizure (HCC) 12/24/2020    Anemia 05/16/2018    Hyperlipidemia 04/25/2014    Multiple sclerosis (HCC) 03/20/2009     Past Surgical History:   Procedure Laterality Date    CHOLECYSTECTOMY      COLONOSCOPY N/A 02/23/2023    COLORECTAL CANCER SCREENING, NOT HIGH RISK performed by Omid Chapa MD at [facility]    LAMINECTOMY Left 7/24/2024    LEFT L5-S1

## 2024-12-20 ENCOUNTER — OFFICE VISIT (OUTPATIENT)
Dept: CARDIOLOGY CLINIC | Age: 49
End: 2024-12-20
Payer: COMMERCIAL

## 2024-12-20 VITALS
DIASTOLIC BLOOD PRESSURE: 88 MMHG | BODY MASS INDEX: 36.11 KG/M2 | SYSTOLIC BLOOD PRESSURE: 138 MMHG | WEIGHT: 217 LBS | OXYGEN SATURATION: 99 % | HEART RATE: 97 BPM

## 2024-12-20 DIAGNOSIS — R73.9 HYPERGLYCEMIA: ICD-10-CM

## 2024-12-20 DIAGNOSIS — R94.31 ABNORMAL EKG: Primary | ICD-10-CM

## 2024-12-20 DIAGNOSIS — F17.200 SMOKER: ICD-10-CM

## 2024-12-20 DIAGNOSIS — R94.31 ABNORMAL ELECTROCARDIOGRAPHY: ICD-10-CM

## 2024-12-20 DIAGNOSIS — E78.2 MIXED HYPERLIPIDEMIA: ICD-10-CM

## 2024-12-20 LAB
ALBUMIN SERPL-MCNC: 4.4 G/DL (ref 3.5–4.6)
ALP SERPL-CCNC: 105 U/L (ref 40–130)
ALT SERPL-CCNC: 14 U/L (ref 0–33)
ANION GAP SERPL CALCULATED.3IONS-SCNC: 12 MEQ/L (ref 9–15)
AST SERPL-CCNC: 10 U/L (ref 0–35)
BILIRUB SERPL-MCNC: 0.4 MG/DL (ref 0.2–0.7)
BUN SERPL-MCNC: 9 MG/DL (ref 6–20)
CALCIUM SERPL-MCNC: 9.7 MG/DL (ref 8.5–9.9)
CHLORIDE SERPL-SCNC: 97 MEQ/L (ref 95–107)
CO2 SERPL-SCNC: 27 MEQ/L (ref 20–31)
CREAT SERPL-MCNC: 0.74 MG/DL (ref 0.5–0.9)
GLOBULIN SER CALC-MCNC: 2.8 G/DL (ref 2.3–3.5)
GLUCOSE SERPL-MCNC: 86 MG/DL (ref 70–99)
POTASSIUM SERPL-SCNC: 3.4 MEQ/L (ref 3.4–4.9)
PROT SERPL-MCNC: 7.2 G/DL (ref 6.3–8)
SODIUM SERPL-SCNC: 136 MEQ/L (ref 135–144)

## 2024-12-20 PROCEDURE — G8484 FLU IMMUNIZE NO ADMIN: HCPCS | Performed by: INTERNAL MEDICINE

## 2024-12-20 PROCEDURE — G8417 CALC BMI ABV UP PARAM F/U: HCPCS | Performed by: INTERNAL MEDICINE

## 2024-12-20 PROCEDURE — G8427 DOCREV CUR MEDS BY ELIG CLIN: HCPCS | Performed by: INTERNAL MEDICINE

## 2024-12-20 PROCEDURE — 1036F TOBACCO NON-USER: CPT | Performed by: INTERNAL MEDICINE

## 2024-12-20 PROCEDURE — 99204 OFFICE O/P NEW MOD 45 MIN: CPT | Performed by: INTERNAL MEDICINE

## 2024-12-20 ASSESSMENT — ENCOUNTER SYMPTOMS
NAUSEA: 0
EYES NEGATIVE: 1
ABDOMINAL PAIN: 0
WHEEZING: 0
ALLERGIC/IMMUNOLOGIC NEGATIVE: 1
GASTROINTESTINAL NEGATIVE: 1
SHORTNESS OF BREATH: 0
VOMITING: 0

## 2024-12-20 NOTE — PROGRESS NOTES
Chief Complaint   Patient presents with    Establish Cardiologist     DR TATUM REFERRRING    Chest Pain     EKG 12/2/24 12-20-24: Patient presents for initial medical evaluation. Patient is followed on a regular basis by Dr. Ruiz primary care provider on file.  Patient with history of hyperlipidemia, tobacco use.   No prior cardiac disease.  Patient was noted to have an abnormal EKG with questionable inferior or anterior wall myocardial infarction of age indeterminant.  She denies any significant angina or heart failure type symptoms at this time.  No prior history of stress test or heart catheterization            Patient Active Problem List   Diagnosis    Bilateral carpal tunnel syndrome    Polyp of colon    Herniated nucleus pulposus, L5-S1, left    Smoker    Sacroiliitis (HCC)    Lumbar disc herniation    Anemia    Anxiety    ADD (attention deficit disorder) without hyperactivity    Hyperlipidemia    Multiple sclerosis (HCC)    Seizure (HCC)    Left lumbar radiculopathy       Past Surgical History:   Procedure Laterality Date    CHOLECYSTECTOMY      COLONOSCOPY N/A 02/23/2023    COLORECTAL CANCER SCREENING, NOT HIGH RISK performed by Omid Chapa MD at [facility]    LAMINECTOMY Left 7/24/2024    LEFT L5-S1 LYSIS OF SCARS; MICRODISKECTOMY performed by Shira Tsai MD at [facility] OR    LUMBAR DISCECTOMY  01/23/2023    SEPTOPLASTY      VULVA SURGERY      abcess removal       Social History     Socioeconomic History    Marital status: Single   Tobacco Use    Smoking status: Former     Average packs/day: 1 pack/day for 26.0 years (26.0 ttl pk-yrs)     Types: Cigarettes     Start date: 1993    Smokeless tobacco: Never    Tobacco comments:     1 cigarette per day   Vaping Use    Vaping status: Every Day    Substances: Nicotine, menthol    Devices: Disposable   Substance and Sexual Activity    Alcohol use: Not Currently     Comment: socially    Drug use: Never     Social Determinants of Health

## 2024-12-21 LAB
ESTIMATED AVERAGE GLUCOSE: 128 MG/DL
HBA1C MFR BLD: 6.1 % (ref 4–6)

## 2024-12-23 ENCOUNTER — OFFICE VISIT (OUTPATIENT)
Age: 49
End: 2024-12-23
Payer: COMMERCIAL

## 2024-12-23 VITALS
DIASTOLIC BLOOD PRESSURE: 100 MMHG | WEIGHT: 220 LBS | TEMPERATURE: 97.1 F | HEIGHT: 65 IN | BODY MASS INDEX: 36.65 KG/M2 | SYSTOLIC BLOOD PRESSURE: 140 MMHG

## 2024-12-23 DIAGNOSIS — M54.16 LUMBAR RADICULOPATHY: ICD-10-CM

## 2024-12-23 DIAGNOSIS — M96.1 LUMBAR POST-LAMINECTOMY SYNDROME: Primary | ICD-10-CM

## 2024-12-23 PROCEDURE — 99215 OFFICE O/P EST HI 40 MIN: CPT | Performed by: PAIN MEDICINE

## 2024-12-23 PROCEDURE — 1036F TOBACCO NON-USER: CPT | Performed by: PAIN MEDICINE

## 2024-12-23 PROCEDURE — 99213 OFFICE O/P EST LOW 20 MIN: CPT | Performed by: PAIN MEDICINE

## 2024-12-23 PROCEDURE — G8417 CALC BMI ABV UP PARAM F/U: HCPCS | Performed by: PAIN MEDICINE

## 2024-12-23 PROCEDURE — G8484 FLU IMMUNIZE NO ADMIN: HCPCS | Performed by: PAIN MEDICINE

## 2024-12-23 PROCEDURE — G8427 DOCREV CUR MEDS BY ELIG CLIN: HCPCS | Performed by: PAIN MEDICINE

## 2024-12-23 RX ORDER — OXYCODONE AND ACETAMINOPHEN 7.5; 325 MG/1; MG/1
1 TABLET ORAL EVERY 6 HOURS PRN
Status: CANCELLED | OUTPATIENT
Start: 2024-12-23

## 2024-12-23 NOTE — PROGRESS NOTES
alert and oriented to person, place, and time. Mental status is at baseline.   Psychiatric:         Mood and Affect: Mood normal.         Behavior: Behavior normal.         Thought Content: Thought content normal.         Judgment: Judgment normal.           Plan   Good gait able to walk on Toes and heels,Pain on Flexion and extension, Tender SI Joints and facets but again this is where her Scar is, No pain on Gainslins pain on SLRs.   Had microdiscectomy in July pain is worse had PT ending in October, EMG shows Radiculopathy MRI shows Epidural fibrosis havis=ng issues walking and standing.  Right L5-S1 Trans foraminal Epidural Steroid injection with no relief pt asking for stimulator trial

## 2024-12-25 ASSESSMENT — ENCOUNTER SYMPTOMS
FACIAL SWELLING: 0
APNEA: 0
ABDOMINAL DISTENTION: 0
CHOKING: 0
BLOOD IN STOOL: 0
PHOTOPHOBIA: 0

## 2025-01-06 ENCOUNTER — HOSPITAL ENCOUNTER (OUTPATIENT)
Dept: CT IMAGING | Age: 50
Discharge: HOME | End: 2025-01-08
Attending: PAIN MEDICINE
Payer: COMMERCIAL

## 2025-01-06 DIAGNOSIS — M54.16 LUMBAR RADICULOPATHY: ICD-10-CM

## 2025-01-06 DIAGNOSIS — M96.1 LUMBAR POST-LAMINECTOMY SYNDROME: ICD-10-CM

## 2025-01-06 PROCEDURE — 72128 CT CHEST SPINE W/O DYE: CPT

## 2025-01-27 ENCOUNTER — HOSPITAL ENCOUNTER (OUTPATIENT)
Age: 50
Discharge: HOME | End: 2025-01-29
Attending: INTERNAL MEDICINE
Payer: COMMERCIAL

## 2025-01-27 ENCOUNTER — HOSPITAL ENCOUNTER (OUTPATIENT)
Dept: NUCLEAR MEDICINE | Age: 50
Discharge: HOME | End: 2025-01-29
Attending: INTERNAL MEDICINE
Payer: COMMERCIAL

## 2025-01-27 VITALS
HEIGHT: 65 IN | SYSTOLIC BLOOD PRESSURE: 140 MMHG | WEIGHT: 220 LBS | BODY MASS INDEX: 36.65 KG/M2 | DIASTOLIC BLOOD PRESSURE: 100 MMHG

## 2025-01-27 DIAGNOSIS — R94.31 ABNORMAL EKG: ICD-10-CM

## 2025-01-27 DIAGNOSIS — R94.31 ABNORMAL ELECTROCARDIOGRAPHY: ICD-10-CM

## 2025-01-27 LAB
ECHO AV AREA PEAK VELOCITY: 2.4 CM2
ECHO AV AREA VTI: 2.7 CM2
ECHO AV AREA/BSA VTI: 1.3 CM2/M2
ECHO AV CUSP MM: 1.7 CM
ECHO AV MEAN GRADIENT: 3 MMHG
ECHO AV MEAN VELOCITY: 0.8 M/S
ECHO AV PEAK GRADIENT: 5 MMHG
ECHO AV PEAK GRADIENT: 5 MMHG
ECHO AV PEAK VELOCITY: 1.1 M/S
ECHO AV PEAK VELOCITY: 1.1 M/S
ECHO AV VTI: 25.1 CM
ECHO BSA: 2.14 M2
ECHO EST RA PRESSURE: 3 MMHG
ECHO LA DIAMETER INDEX: 1.84 CM/M2
ECHO LA DIAMETER: 3.8 CM
ECHO LA VOL A-L A2C: 45 ML (ref 22–52)
ECHO LA VOL A-L A4C: 48 ML (ref 22–52)
ECHO LA VOL MOD A2C: 44 ML (ref 22–52)
ECHO LA VOL MOD A4C: 45 ML (ref 22–52)
ECHO LA VOLUME AREA LENGTH: 47 ML
ECHO LA VOLUME INDEX A-L A2C: 22 ML/M2 (ref 16–34)
ECHO LA VOLUME INDEX A-L A4C: 23 ML/M2 (ref 16–34)
ECHO LA VOLUME INDEX AREA LENGTH: 23 ML/M2 (ref 16–34)
ECHO LA VOLUME INDEX MOD A2C: 21 ML/M2 (ref 16–34)
ECHO LA VOLUME INDEX MOD A4C: 22 ML/M2 (ref 16–34)
ECHO LV E' LATERAL VELOCITY: 6.36 CM/S
ECHO LV E' SEPTAL VELOCITY: 10.86 CM/S
ECHO LV EDV A2C: 69 ML
ECHO LV EDV A4C: 95 ML
ECHO LV EDV BP: 82 ML (ref 56–104)
ECHO LV EDV INDEX A4C: 46 ML/M2
ECHO LV EDV INDEX BP: 40 ML/M2
ECHO LV EDV NDEX A2C: 33 ML/M2
ECHO LV EJECTION FRACTION A2C: 60 %
ECHO LV EJECTION FRACTION A4C: 67 %
ECHO LV EJECTION FRACTION BIPLANE: 64 % (ref 55–100)
ECHO LV ESV A2C: 28 ML
ECHO LV ESV A4C: 31 ML
ECHO LV ESV BP: 29 ML (ref 19–49)
ECHO LV ESV INDEX A2C: 14 ML/M2
ECHO LV ESV INDEX A4C: 15 ML/M2
ECHO LV ESV INDEX BP: 14 ML/M2
ECHO LV FRACTIONAL SHORTENING: 33 % (ref 28–44)
ECHO LV INTERNAL DIMENSION DIASTOLE INDEX: 2.23 CM/M2
ECHO LV INTERNAL DIMENSION DIASTOLIC: 4.6 CM (ref 3.9–5.3)
ECHO LV INTERNAL DIMENSION SYSTOLIC INDEX: 1.5 CM/M2
ECHO LV INTERNAL DIMENSION SYSTOLIC: 3.1 CM
ECHO LV IVSD: 1.2 CM (ref 0.6–0.9)
ECHO LV IVSS: 1.5 CM
ECHO LV MASS 2D: 192.9 G (ref 67–162)
ECHO LV MASS INDEX 2D: 93.7 G/M2 (ref 43–95)
ECHO LV POSTERIOR WALL DIASTOLIC: 1.1 CM (ref 0.6–0.9)
ECHO LV POSTERIOR WALL SYSTOLIC: 1.9 CM
ECHO LV RELATIVE WALL THICKNESS RATIO: 0.48
ECHO LVOT AREA: 3.1 CM2
ECHO LVOT AV VTI INDEX: 0.87
ECHO LVOT DIAM: 2 CM
ECHO LVOT MEAN GRADIENT: 1 MMHG
ECHO LVOT PEAK GRADIENT: 3 MMHG
ECHO LVOT PEAK GRADIENT: 3 MMHG
ECHO LVOT PEAK VELOCITY: 0.9 M/S
ECHO LVOT PEAK VELOCITY: 0.9 M/S
ECHO LVOT STROKE VOLUME INDEX: 33.4 ML/M2
ECHO LVOT SV: 68.8 ML
ECHO LVOT VTI: 21.9 CM
ECHO MV A VELOCITY: 0.74 M/S
ECHO MV AREA VTI: 2.7 CM2
ECHO MV E DECELERATION TIME (DT): 214 MS
ECHO MV E VELOCITY: 0.8 M/S
ECHO MV E/A RATIO: 1.08
ECHO MV E/E' LATERAL: 12.58
ECHO MV E/E' RATIO (AVERAGED): 9.97
ECHO MV E/E' SEPTAL: 7.37
ECHO MV LVOT VTI INDEX: 1.18
ECHO MV MAX VELOCITY: 0.9 M/S
ECHO MV MEAN GRADIENT: 1 MMHG
ECHO MV MEAN VELOCITY: 0.6 M/S
ECHO MV PEAK GRADIENT: 3 MMHG
ECHO MV VTI: 25.9 CM
ECHO PV MAX VELOCITY: 0.8 M/S
ECHO PV PEAK GRADIENT: 2 MMHG
ECHO RIGHT VENTRICULAR SYSTOLIC PRESSURE (RVSP): 15 MMHG
ECHO RV INTERNAL DIMENSION: 2.7 CM
ECHO RV TAPSE: 1.8 CM (ref 1.7–?)
ECHO TV REGURGITANT MAX VELOCITY: 1.75 M/S
ECHO TV REGURGITANT PEAK GRADIENT: 12 MMHG
NUC STRESS EJECTION FRACTION: 59 %
STRESS BASELINE DIAS BP: 88 MMHG
STRESS BASELINE HR: 76 BPM
STRESS BASELINE ST DEPRESSION: 0 MM
STRESS BASELINE SYS BP: 145 MMHG
STRESS ESTIMATED WORKLOAD: 1 METS
STRESS PEAK DIAS BP: 92 MMHG
STRESS PEAK SYS BP: 166 MMHG
STRESS PERCENT HR ACHIEVED: 64 %
STRESS POST PEAK HR: 111 BPM
STRESS RATE PRESSURE PRODUCT: NORMAL BPM*MMHG
STRESS ST DEPRESSION: 0 MM
STRESS TARGET HR: 173 BPM
TID: 1.26

## 2025-01-27 PROCEDURE — 78452 HT MUSCLE IMAGE SPECT MULT: CPT

## 2025-01-27 PROCEDURE — 3430000000 HC RX DIAGNOSTIC RADIOPHARMACEUTICAL: Performed by: INTERNAL MEDICINE

## 2025-01-27 PROCEDURE — 93018 CV STRESS TEST I&R ONLY: CPT | Performed by: INTERNAL MEDICINE

## 2025-01-27 PROCEDURE — 2500000003 HC RX 250 WO HCPCS: Performed by: INTERNAL MEDICINE

## 2025-01-27 PROCEDURE — 93017 CV STRESS TEST TRACING ONLY: CPT

## 2025-01-27 PROCEDURE — 93306 TTE W/DOPPLER COMPLETE: CPT

## 2025-01-27 PROCEDURE — A9502 TC99M TETROFOSMIN: HCPCS | Performed by: INTERNAL MEDICINE

## 2025-01-27 PROCEDURE — 6360000002 HC RX W HCPCS: Performed by: INTERNAL MEDICINE

## 2025-01-27 PROCEDURE — 93306 TTE W/DOPPLER COMPLETE: CPT | Performed by: INTERNAL MEDICINE

## 2025-01-27 RX ORDER — SODIUM CHLORIDE 0.9 % (FLUSH) 0.9 %
10 SYRINGE (ML) INJECTION PRN
Status: DISCONTINUED | OUTPATIENT
Start: 2025-01-27 | End: 2025-01-30 | Stop reason: HOSPADM

## 2025-01-27 RX ORDER — REGADENOSON 0.08 MG/ML
0.4 INJECTION, SOLUTION INTRAVENOUS
Status: COMPLETED | OUTPATIENT
Start: 2025-01-27 | End: 2025-01-27

## 2025-01-27 RX ADMIN — TETROFOSMIN 11.2 MILLICURIE: 1.38 INJECTION, POWDER, LYOPHILIZED, FOR SOLUTION INTRAVENOUS at 09:50

## 2025-01-27 RX ADMIN — REGADENOSON 0.4 MG: 0.08 INJECTION, SOLUTION INTRAVENOUS at 11:34

## 2025-01-27 RX ADMIN — Medication 10 ML: at 09:46

## 2025-01-27 RX ADMIN — Medication 10 ML: at 11:33

## 2025-01-27 RX ADMIN — TETROFOSMIN 30.1 MILLICURIE: 1.38 INJECTION, POWDER, LYOPHILIZED, FOR SOLUTION INTRAVENOUS at 11:38

## 2025-03-28 ENCOUNTER — HOSPITAL ENCOUNTER (OUTPATIENT)
Dept: PHYSICAL THERAPY | Age: 50
Setting detail: THERAPIES SERIES
Discharge: HOME | End: 2025-03-28
Payer: COMMERCIAL

## 2025-03-28 PROCEDURE — 97110 THERAPEUTIC EXERCISES: CPT

## 2025-03-28 PROCEDURE — 97162 PT EVAL MOD COMPLEX 30 MIN: CPT

## 2025-03-28 NOTE — THERAPY EVALUATION
Physical Therapy Evaluation/Plan of Care   [place] [address] [facility] [address]  Dept: [phone]  Dept Fax: [phone]  Loc: [phone]    Physical Therapy: Initial Evaluation    General Information    Patient: Elizabeth Stratton (50 y.o.     female)   Examination Date: 2025   :  1975 ;    Confirmed: Yes MRN: [id number]  CSN: [id number]   Insurance: Payor: [facility] / Plan: [facility] MEDICAID / Product Type: *No Product type* /   Insurance ID: [id number] - (Medicaid Managed)  PT Insurance Information: [facility] Secondary Insurance (if applicable):     Referring Physician: Manasa Urias APRN - CNP       Visits to Date/Visits Approved:     No Show/Cancelled Appts: 0 / 0     Medical Diagnosis: Radiculopathy, lumbosacral region [M54.17]  Spinal stenosis, lumbar region without neurogenic claudication [M48.061]  Spondylosis without myelopathy or radiculopathy, lumbar region [M47.816]        Treatment Diagnosis: LBP, difficulty ambulating      SUBJECTIVE:     Onset date:     Subjective/ Mechanism of Injury: Patient presents to therapy for back pain. Reports history of multiple surgeries to low back. Reports last surgery was . States she may need another one. Patient with significant pain. Reports standing and walking increase the pain. Takes predizone taper, oxy, muscle relaxor and gabapentin for relief. Uses TENs as well, reports she will use heated seats in her moms car. Reports she wants an MRI, but has to do therapy. Reports pain radiates distally to rex LE. Denies falls. Reports she will get injections every 2 weeks.     Precautions/Contraindications/Restrictions: none ; MS    Changes in Bowel/Bladder Function: no  Saddle Anesthesia: no  Unexplained Weight Loss: no  Unrelenting Night Pain: no  Sudden Weakness/Falls: no    Interventions for current problem: Injections, outpatient PT,

## 2025-03-31 ENCOUNTER — TRANSCRIBE ORDERS (OUTPATIENT)
Dept: ADMINISTRATIVE | Age: 50
End: 2025-03-31

## 2025-03-31 DIAGNOSIS — G35 MULTIPLE SCLEROSIS (HCC): Primary | ICD-10-CM

## 2025-04-01 ENCOUNTER — HOSPITAL ENCOUNTER (OUTPATIENT)
Dept: PHYSICAL THERAPY | Age: 50
Setting detail: THERAPIES SERIES
Discharge: HOME | End: 2025-04-01

## 2025-04-01 NOTE — PROGRESS NOTES
Therapy                            Cancellation/No-show Note    Date: 2025  Patient: Elizabeth Stratton (50 y.o. female)  : 1975  MRN:  [id number]  Referring Physician: Manasa Urias, APRN - CNP    Medical Diagnosis: Radiculopathy, lumbosacral region [M54.17]  Spinal stenosis, lumbar region without neurogenic claudication [M48.061]  Spondylosis without myelopathy or radiculopathy, lumbar region [M47.816]      Visit Information:  Insurance: Payor: [facility] / Plan: [facility] MEDICAID / Product Type: *No Product type* /   Visits to Date: 1   No Show/Cancelled Appts: 0       For today's appointment patient:  [x]  Cancelled  and   []  Rescheduled appointment  []  No-show   []  Called pt to remind of next appointment     Reason given by patient:  []  Patient ill  []  Conflicting appointment  []  No transportation    []  Conflict with work  []  No reason given  [x]  Other:  In too much pain and body is not doing well, calling doctor and will call back to schedule more    [] Pt has future appointments scheduled, no follow up needed  [] Pt requests to be on hold.    Reason:   If > 2 weeks please discuss with therapist.  [] Therapist to call pt for follow up  [] [place] to call pt to reschedule   Comments:       Signature: Electronically signed by Jenny Jung PT on 25 at 8:16 AM EDT

## 2025-04-04 ENCOUNTER — HOSPITAL ENCOUNTER (OUTPATIENT)
Dept: PHYSICAL THERAPY | Age: 50
Setting detail: THERAPIES SERIES
Discharge: HOME | End: 2025-04-04

## 2025-05-05 LAB
ALBUMIN SERPL-MCNC: 4.4 G/DL (ref 3.5–4.6)
ALP SERPL-CCNC: 115 U/L (ref 40–130)
ALT SERPL-CCNC: 14 U/L (ref 0–33)
ANION GAP SERPL CALCULATED.3IONS-SCNC: 9 MEQ/L (ref 9–15)
AST SERPL-CCNC: 16 U/L (ref 0–35)
BASOPHILS # BLD: 0.1 K/UL (ref 0–0.2)
BASOPHILS NFR BLD: 0.8 %
BILIRUB SERPL-MCNC: 0.3 MG/DL (ref 0.2–0.7)
BUN SERPL-MCNC: 7 MG/DL (ref 6–20)
CALCIUM SERPL-MCNC: 9.5 MG/DL (ref 8.5–9.9)
CHLORIDE SERPL-SCNC: 101 MEQ/L (ref 95–107)
CO2 SERPL-SCNC: 28 MEQ/L (ref 20–31)
CREAT SERPL-MCNC: 0.87 MG/DL (ref 0.5–0.9)
EOSINOPHIL # BLD: 0.1 K/UL (ref 0–0.7)
EOSINOPHIL NFR BLD: 2 %
ERYTHROCYTE [DISTWIDTH] IN BLOOD BY AUTOMATED COUNT: 15.2 % (ref 11.5–14.5)
GLOBULIN SER CALC-MCNC: 2.6 G/DL (ref 2.3–3.5)
GLUCOSE SERPL-MCNC: 100 MG/DL (ref 70–99)
HCT VFR BLD AUTO: 42.5 % (ref 37–47)
HGB BLD-MCNC: 13.3 G/DL (ref 12–16)
LYMPHOCYTES # BLD: 2.3 K/UL (ref 1–4.8)
LYMPHOCYTES NFR BLD: 35.5 %
MCH RBC QN AUTO: 27.2 PG (ref 27–31.3)
MCHC RBC AUTO-ENTMCNC: 31.3 % (ref 33–37)
MCV RBC AUTO: 86.9 FL (ref 79.4–94.8)
MONOCYTES # BLD: 0.4 K/UL (ref 0.2–0.8)
MONOCYTES NFR BLD: 6.8 %
NEUTROPHILS # BLD: 3.5 K/UL (ref 1.4–6.5)
NEUTS SEG NFR BLD: 54.6 %
PLATELET # BLD AUTO: 314 K/UL (ref 130–400)
POTASSIUM SERPL-SCNC: 3.8 MEQ/L (ref 3.4–4.9)
PROT SERPL-MCNC: 7 G/DL (ref 6.3–8)
RBC # BLD AUTO: 4.89 M/UL (ref 4.2–5.4)
SODIUM SERPL-SCNC: 138 MEQ/L (ref 135–144)
WBC # BLD AUTO: 6.5 K/UL (ref 4.8–10.8)

## 2025-05-06 LAB
AMPHET CTO UR CFM-MCNC: NEGATIVE NG/ML
ANNOTATION COMMENT IMP: NORMAL
BARBITURATES CTO UR CFM-MCNC: NEGATIVE NG/ML
BENZODIAZ CTO UR CFM-MCNC: NEGATIVE NG/ML
CANNABINOIDS CTO UR CFM-MCNC: NEGATIVE NG/ML
COCAINE CTO UR CFM-MCNC: NEGATIVE NG/ML
CREAT UR-MCNC: 99.6 MG/DL (ref 20–400)
ETHANOL UR CFM-MCNC: NEGATIVE MG/DL
METHADONE CTO UR CFM-MCNC: NEGATIVE NG/ML
OPIATES CTO UR CFM-MCNC: NORMAL NG/ML
PCP CTO UR CFM-MCNC: NEGATIVE NG/ML
PROPOXYPH CTO UR CFM-MCNC: NEGATIVE NG/ML

## 2025-05-09 NOTE — DISCHARGE SUMMARY
by >/=8 points to demonstrate increased subjective function. LTG 5 Current Status:: did not return   Unable to assess     Body Structures, Functions, Activity Limitations Requiring Skilled Therapeutic Intervention: Decreased functional mobility , Decreased ADL status, Decreased ROM, Decreased body mechanics, Decreased strength, Increased pain, Decreased posture  Assessment: Patient completed evaluation. She cancelled x2 appointments. Several attempts made to contact patient without response. Due to attendance policy patient to be discharged, unable to reassess goals.  Therapy Prognosis: Fair    PLAN: [] Evaluate and Treat  Frequency/Duration:  discharge                  Patient Status:[] Continue/ Initiate plan of Care     [x] Discharge PT.  Recommend pt continue with HEP.      [] Additional visits requested, Please re-certify for additional visits:     [] Hold     Signature:  Electronically signed by Dorcas Kuhn PT on 5/9/25 at 9:22 AM EDT          If you have any questions or concerns, please don't hesitate to call.  Thank you for your referral.

## 2025-05-10 LAB
6MAM UR CFM-MCNC: <10 NG/ML
CODEINE UR CFM-MCNC: <20 NG/ML
HYDROCODONE UR CFM-MCNC: <20 NG/ML
HYDROMORPHONE UR CFM-MCNC: <20 NG/ML
MORPHINE UR CFM-MCNC: <20 NG/ML
NORHYDROCODONE UR CFM-MCNC: <20 NG/ML
NOROXYCODONE UR CFM-MCNC: 3709 NG/ML
NOROXYMORPHONE UR CFM-MCNC: 181 NG/ML
OXYCODONE UR CFM-MCNC: 625 NG/ML
OXYMORPHONE UR CFM-MCNC: <20 NG/ML

## 2025-05-21 ENCOUNTER — HOSPITAL ENCOUNTER (OUTPATIENT)
Dept: MRI IMAGING | Age: 50
Discharge: HOME | End: 2025-05-23
Attending: PSYCHIATRY & NEUROLOGY
Payer: COMMERCIAL

## 2025-05-21 DIAGNOSIS — G35 MULTIPLE SCLEROSIS (HCC): ICD-10-CM

## 2025-05-21 PROCEDURE — 6360000004 HC RX CONTRAST MEDICATION: Performed by: PSYCHIATRY & NEUROLOGY

## 2025-05-21 PROCEDURE — A9579 GAD-BASE MR CONTRAST NOS,1ML: HCPCS | Performed by: PSYCHIATRY & NEUROLOGY

## 2025-05-21 PROCEDURE — 70553 MRI BRAIN STEM W/O & W/DYE: CPT

## 2025-05-21 RX ADMIN — GADOTERIDOL 20 ML: 279.3 INJECTION, SOLUTION INTRAVENOUS at 09:25

## 2025-06-17 SDOH — ECONOMIC STABILITY: INCOME INSECURITY: IN THE LAST 12 MONTHS, WAS THERE A TIME WHEN YOU WERE NOT ABLE TO PAY THE MORTGAGE OR RENT ON TIME?: NO

## 2025-06-17 SDOH — ECONOMIC STABILITY: FOOD INSECURITY: WITHIN THE PAST 12 MONTHS, YOU WORRIED THAT YOUR FOOD WOULD RUN OUT BEFORE YOU GOT MONEY TO BUY MORE.: NEVER TRUE

## 2025-06-17 SDOH — ECONOMIC STABILITY: FOOD INSECURITY: WITHIN THE PAST 12 MONTHS, THE FOOD YOU BOUGHT JUST DIDN'T LAST AND YOU DIDN'T HAVE MONEY TO GET MORE.: NEVER TRUE

## 2025-06-17 ASSESSMENT — PATIENT HEALTH QUESTIONNAIRE - PHQ9
SUM OF ALL RESPONSES TO PHQ9 QUESTIONS 1 & 2: 2
SUM OF ALL RESPONSES TO PHQ QUESTIONS 1-9: 2
2. FEELING DOWN, DEPRESSED OR HOPELESS: NOT AT ALL
2. FEELING DOWN, DEPRESSED OR HOPELESS: NOT AT ALL
1. LITTLE INTEREST OR PLEASURE IN DOING THINGS: MORE THAN HALF THE DAYS
SUM OF ALL RESPONSES TO PHQ QUESTIONS 1-9: 2
SUM OF ALL RESPONSES TO PHQ QUESTIONS 1-9: 2
1. LITTLE INTEREST OR PLEASURE IN DOING THINGS: MORE THAN HALF THE DAYS
SUM OF ALL RESPONSES TO PHQ QUESTIONS 1-9: 2

## 2025-06-18 ENCOUNTER — OFFICE VISIT (OUTPATIENT)
Dept: PRIMARY CARE CLINIC | Age: 50
End: 2025-06-18
Payer: COMMERCIAL

## 2025-06-18 VITALS
BODY MASS INDEX: 36.32 KG/M2 | HEART RATE: 77 BPM | DIASTOLIC BLOOD PRESSURE: 94 MMHG | OXYGEN SATURATION: 98 % | HEIGHT: 65 IN | SYSTOLIC BLOOD PRESSURE: 130 MMHG | WEIGHT: 218 LBS

## 2025-06-18 DIAGNOSIS — E78.2 MIXED HYPERLIPIDEMIA: Primary | ICD-10-CM

## 2025-06-18 DIAGNOSIS — Z12.11 COLON CANCER SCREENING: ICD-10-CM

## 2025-06-18 PROCEDURE — 99213 OFFICE O/P EST LOW 20 MIN: CPT | Performed by: INTERNAL MEDICINE

## 2025-06-18 PROCEDURE — G8427 DOCREV CUR MEDS BY ELIG CLIN: HCPCS | Performed by: INTERNAL MEDICINE

## 2025-06-18 PROCEDURE — G8417 CALC BMI ABV UP PARAM F/U: HCPCS | Performed by: INTERNAL MEDICINE

## 2025-06-18 PROCEDURE — 1036F TOBACCO NON-USER: CPT | Performed by: INTERNAL MEDICINE

## 2025-06-18 RX ORDER — ROSUVASTATIN CALCIUM 20 MG/1
20 TABLET, COATED ORAL NIGHTLY
Qty: 30 TABLET | Refills: 11 | Status: SHIPPED | OUTPATIENT
Start: 2025-06-18

## 2025-06-18 RX ORDER — ATOGEPANT 60 MG/1
60 TABLET ORAL DAILY
COMMUNITY
Start: 2025-06-17 | End: 2025-07-17

## 2025-06-18 RX ORDER — ZONISAMIDE 100 MG/1
100 CAPSULE ORAL DAILY
COMMUNITY
Start: 2025-06-17 | End: 2026-06-17

## 2025-06-18 RX ORDER — CLADRIBINE 10 MG/1
TABLET ORAL
COMMUNITY
Start: 2025-06-06

## 2025-06-18 RX ORDER — ORPHENADRINE CITRATE 100 MG/1
100 TABLET ORAL NIGHTLY
COMMUNITY

## 2025-06-18 NOTE — PROGRESS NOTES
Elizabeth Stratton 50 y.o. female presents today with   Chief Complaint   Patient presents with    6 Month Follow-Up    Cholesterol Problem       HPI  History of Present Illness  The patient presents for a follow-up visit.    The primary reason for this visit is to address hypercholesterolemia. He has been taking Crestor 20 mg without experiencing any adverse effects, including muscle aches or pains. Blood work conducted in 12/2024 at [facility] revealed prediabetes with a hemoglobin A1c of 6.1. A stress test was also performed, but he has not received any communication from his cardiologist regarding the results. He has a history of an abnormal EKG, which prompted the stress test. He reports no current chest pain.    He has multiple sclerosis (MS) and is on a new medication regimen, which appears to be effectively managing his condition. He has standing orders for a new MS medication and is scheduled for testing at the end of 07/2025. He reports that his MS is currently under control.    He has discontinued Ambien and is now taking doxepin, which he finds more effective. He reports no morning hangover effect from this medication. He was previously prescribed Xanax for panic attacks, which he found beneficial during severe episodes. However, he has since discontinued this medication.    He has ceased attending pain management due to insurance issues and is now under the care of his neurologist for back pain. He takes gabapentin as needed and Percocet for pain management. He has a history of an allergic reaction to back injections administered by pain management, which resulted in an emergency room visit and a diagnosis of an inferior infarct.    He has undergone a colonoscopy and was advised to repeat the procedure in 2 years, which was approximately 2 months ago.    FAMILY HISTORY  He does not have a family history of multiple sclerosis.  He does not have a family history of narcotic or alcohol abuse.

## 2025-08-20 DIAGNOSIS — E78.2 MIXED HYPERLIPIDEMIA: ICD-10-CM

## 2025-08-20 LAB
CHOLEST SERPL-MCNC: 244 MG/DL (ref 0–199)
HDLC SERPL-MCNC: 56 MG/DL (ref 40–59)
LDLC SERPL CALC-MCNC: 150 MG/DL (ref 0–129)
TRIGL SERPL-MCNC: 190 MG/DL (ref 0–150)

## (undated) DEVICE — SPONGE DRN W4XL4IN RAYON/POLYESTER 6 PLY NONWOVEN PRECUT 2 PER PK

## (undated) DEVICE — GLOVE ORANGE PI 8   MSG9080

## (undated) DEVICE — CARBIDE MATCH HEAD

## (undated) DEVICE — 1010 S-DRAPE TOWEL DRAPE 10/BX: Brand: STERI-DRAPE™

## (undated) DEVICE — BRUSH ENDO CLN L90.5IN SHTH DIA1.7MM BRIST DIA5-7MM 2-6MM

## (undated) DEVICE — COVER MICSCP W46XL120IN 4 BINOC GLS LENS LEICA

## (undated) DEVICE — SUTURE VICRYL SZ 3-0 L18IN ABSRB UD PS-2 L19MM 3/8 CRV PRIM J497H

## (undated) DEVICE — 4-PORT MANIFOLD: Brand: NEPTUNE 2

## (undated) DEVICE — LABEL MED MINI W/ MARKER

## (undated) DEVICE — KAIRISON TUBING SET PNEUMATIC, (3000 MM), STERILE, DISPOSABLE, TO BE USED WITH: FK898R, PACKAGE OF 10 PIECES: Brand: KAIRISON

## (undated) DEVICE — Device: Brand: ENDO SMARTCAP

## (undated) DEVICE — SUTURE VICRYL SZ 4-0 L18IN ABSRB UD L19MM PS-2 3/8 CIR PRIM J496H

## (undated) DEVICE — Device: Brand: OLYMPUS

## (undated) DEVICE — TUBE SET 96 MM 64 MM H2O PERISTALTIC STD AUX CHANNEL

## (undated) DEVICE — GLOVE ORANGE PI 7 1/2   MSG9075

## (undated) DEVICE — NEURO: Brand: MEDLINE INDUSTRIES, INC.

## (undated) DEVICE — ENDO CARRY-ON PROCEDURE KIT: Brand: ENDO CARRY-ON PROCEDURE KIT

## (undated) DEVICE — SINGLE PORT MANIFOLD: Brand: NEPTUNE 2

## (undated) DEVICE — TUBING, SUCTION, 1/4" X 10', STRAIGHT: Brand: MEDLINE

## (undated) DEVICE — SYRINGE MED 30ML STD CLR PLAS LUERLOCK TIP N CTRL DISP

## (undated) DEVICE — LIQUIBAND RAPID ADHESIVE 36/CS 0.8ML: Brand: MEDLINE

## (undated) DEVICE — FLOSEAL WITH RECOTHROM - 10ML.: Brand: FLOSEAL HEMOSTATIC MATRIX

## (undated) DEVICE — SUTURE VICRYL + SZ 2-0 L27IN ABSRB UD CP-1 1/2 CIR REV CUT VCP266H

## (undated) DEVICE — SUTURE ABSORBABLE ANTIBACT 1-0 CT-1 24 IN STRATAFIX PDS + SXPP1A443

## (undated) DEVICE — SUTURE VICRYL SZ 0 L27IN ABSRB UD L26MM CP-2 1/2 CIR SGL J870H